# Patient Record
Sex: FEMALE | Race: WHITE | Employment: FULL TIME | URBAN - METROPOLITAN AREA
[De-identification: names, ages, dates, MRNs, and addresses within clinical notes are randomized per-mention and may not be internally consistent; named-entity substitution may affect disease eponyms.]

---

## 2019-03-20 LAB — PAP SMEAR: NORMAL

## 2020-07-17 PROBLEM — C54.1 ENDOMETRIAL CANCER (HCC): Status: ACTIVE | Noted: 2020-07-17

## 2020-07-20 PROBLEM — I26.99 BILATERAL PULMONARY EMBOLISM (HCC): Status: ACTIVE | Noted: 2020-07-20

## 2020-07-20 PROBLEM — E66.9 OBESITY: Status: ACTIVE | Noted: 2020-07-20

## 2020-07-20 PROBLEM — F32.A DEPRESSION: Status: ACTIVE | Noted: 2020-07-20

## 2020-07-20 PROBLEM — J96.91 RESPIRATORY FAILURE WITH HYPOXIA (HCC): Status: ACTIVE | Noted: 2020-07-20

## 2020-07-20 PROBLEM — I10 ESSENTIAL HYPERTENSION: Status: ACTIVE | Noted: 2020-07-20

## 2020-07-20 PROBLEM — D72.829 LEUKOCYTOSIS: Status: ACTIVE | Noted: 2020-07-20

## 2020-07-20 PROBLEM — I80.202: Status: ACTIVE | Noted: 2020-07-20

## 2020-08-07 PROBLEM — C54.1 ENDOMETRIAL CANCER, FIGO STAGE IIIC (HCC): Status: ACTIVE | Noted: 2020-08-07

## 2021-02-01 LAB
ALBUMIN SERPL-MCNC: NORMAL G/DL
ALP BLD-CCNC: NORMAL U/L
ALT SERPL-CCNC: NORMAL U/L
ANION GAP SERPL CALCULATED.3IONS-SCNC: NORMAL MMOL/L
AST SERPL-CCNC: NORMAL U/L
BILIRUB SERPL-MCNC: NORMAL MG/DL
BILIRUBIN, URINE: NORMAL
BLOOD, URINE: NORMAL
BUN BLDV-MCNC: NORMAL MG/DL
CALCIUM SERPL-MCNC: NORMAL MG/DL
CHLORIDE BLD-SCNC: NORMAL MMOL/L
CHOLESTEROL, TOTAL: 166 MG/DL
CHOLESTEROL/HDL RATIO: 3.6
CLARITY: NORMAL
CO2: NORMAL
COLOR: NORMAL
CREAT SERPL-MCNC: NORMAL MG/DL
CREATININE, URINE: 127.27
GFR CALCULATED: NORMAL
GLUCOSE BLD-MCNC: NORMAL MG/DL
GLUCOSE URINE: NORMAL
HDLC SERPL-MCNC: 46 MG/DL (ref 35–70)
KETONES, URINE: NORMAL
LDL CHOLESTEROL CALCULATED: 87 MG/DL (ref 0–160)
LDL CHOLESTEROL DIRECT: NORMAL
LEUKOCYTE ESTERASE, URINE: NORMAL
MICROALBUMIN/CREAT 24H UR: <0.7 MG/G{CREAT}
MICROALBUMIN/CREAT UR-RTO: NORMAL
NITRITE, URINE: NORMAL
PH UA: NORMAL
POTASSIUM SERPL-SCNC: NORMAL MMOL/L
PROTEIN UA: NORMAL
SODIUM BLD-SCNC: NORMAL MMOL/L
SPECIFIC GRAVITY, URINE: NORMAL
TOTAL PROTEIN: NORMAL
TRIGL SERPL-MCNC: 163 MG/DL
UROBILINOGEN, URINE: NORMAL
VLDLC SERPL CALC-MCNC: 33 MG/DL

## 2021-02-26 ENCOUNTER — HOSPITAL ENCOUNTER (OUTPATIENT)
Age: 56
Discharge: HOME | End: 2021-02-26
Payer: COMMERCIAL

## 2021-02-26 ENCOUNTER — HOSPITAL ENCOUNTER (OUTPATIENT)
Dept: CT IMAGING | Age: 56
Discharge: HOME | End: 2021-02-28
Payer: COMMERCIAL

## 2021-02-26 DIAGNOSIS — C54.1 ENDOMETRIAL CANCER (HCC): ICD-10-CM

## 2021-02-26 LAB
BUN BLDV-MCNC: 9 MG/DL (ref 6–20)
CREAT SERPL-MCNC: 0.6 MG/DL (ref 0.5–1)
GFR AFRICAN AMERICAN: >60
GFR NON-AFRICAN AMERICAN: >60 ML/MIN/1.73

## 2021-02-26 PROCEDURE — 6360000002 HC RX W HCPCS: Performed by: OBSTETRICS & GYNECOLOGY

## 2021-02-26 PROCEDURE — 84520 ASSAY OF UREA NITROGEN: CPT

## 2021-02-26 PROCEDURE — 71260 CT THORAX DX C+: CPT

## 2021-02-26 PROCEDURE — 6360000004 HC RX CONTRAST MEDICATION: Performed by: RADIOLOGY

## 2021-02-26 PROCEDURE — 2580000003 HC RX 258: Performed by: RADIOLOGY

## 2021-02-26 PROCEDURE — 36415 COLL VENOUS BLD VENIPUNCTURE: CPT

## 2021-02-26 PROCEDURE — 74177 CT ABD & PELVIS W/CONTRAST: CPT

## 2021-02-26 PROCEDURE — 82565 ASSAY OF CREATININE: CPT

## 2021-02-26 RX ORDER — HEPARIN SODIUM (PORCINE) LOCK FLUSH IV SOLN 100 UNIT/ML 100 UNIT/ML
500 SOLUTION INTRAVENOUS PRN
Status: DISCONTINUED | OUTPATIENT
Start: 2021-02-26 | End: 2021-03-01 | Stop reason: HOSPADM

## 2021-02-26 RX ORDER — SODIUM CHLORIDE 0.9 % (FLUSH) 0.9 %
10 SYRINGE (ML) INJECTION ONCE
Status: COMPLETED | OUTPATIENT
Start: 2021-02-26 | End: 2021-02-26

## 2021-02-26 RX ADMIN — IOPAMIDOL 90 ML: 755 INJECTION, SOLUTION INTRAVENOUS at 16:05

## 2021-02-26 RX ADMIN — Medication 10 ML: at 14:00

## 2021-02-26 RX ADMIN — IOHEXOL 50 ML: 240 INJECTION, SOLUTION INTRATHECAL; INTRAVASCULAR; INTRAVENOUS; ORAL at 16:06

## 2021-02-26 RX ADMIN — SODIUM CHLORIDE, PRESERVATIVE FREE 500 UNITS: 5 INJECTION INTRAVENOUS at 14:00

## 2021-02-26 NOTE — PROGRESS NOTES
Patient here for CT, and needed port accessed for Contrast.  It was easily accessed, by sterile procedure,  blood return and flushed easily. Port was de accessed after saline flush and heparin flush. No bleeding. Dry dressing applied. Georgiana Muse

## 2021-04-07 VITALS
SYSTOLIC BLOOD PRESSURE: 110 MMHG | HEIGHT: 69 IN | DIASTOLIC BLOOD PRESSURE: 74 MMHG | OXYGEN SATURATION: 96 % | RESPIRATION RATE: 14 BRPM | BODY MASS INDEX: 38.51 KG/M2 | HEART RATE: 104 BPM | TEMPERATURE: 97 F | WEIGHT: 260 LBS

## 2021-04-07 RX ORDER — FLUTICASONE PROPIONATE 50 MCG
2 SPRAY, SUSPENSION (ML) NASAL NIGHTLY
COMMUNITY
End: 2021-09-02

## 2021-04-07 RX ORDER — DOCUSATE SODIUM 100 MG/1
100 CAPSULE, LIQUID FILLED ORAL 2 TIMES DAILY
COMMUNITY

## 2021-04-07 RX ORDER — METOPROLOL SUCCINATE 50 MG/1
50 TABLET, EXTENDED RELEASE ORAL DAILY
COMMUNITY
End: 2021-05-06

## 2021-05-06 ENCOUNTER — OFFICE VISIT (OUTPATIENT)
Dept: PRIMARY CARE CLINIC | Age: 56
End: 2021-05-06
Payer: COMMERCIAL

## 2021-05-06 VITALS
WEIGHT: 258 LBS | BODY MASS INDEX: 38.21 KG/M2 | HEIGHT: 69 IN | DIASTOLIC BLOOD PRESSURE: 84 MMHG | OXYGEN SATURATION: 95 % | HEART RATE: 116 BPM | TEMPERATURE: 97.3 F | SYSTOLIC BLOOD PRESSURE: 129 MMHG

## 2021-05-06 DIAGNOSIS — F32.4 MAJOR DEPRESSIVE DISORDER WITH SINGLE EPISODE, IN PARTIAL REMISSION (HCC): ICD-10-CM

## 2021-05-06 DIAGNOSIS — I35.0 AORTIC VALVE STENOSIS, ETIOLOGY OF CARDIAC VALVE DISEASE UNSPECIFIED: ICD-10-CM

## 2021-05-06 DIAGNOSIS — Z95.828 PRESENCE OF IVC FILTER: ICD-10-CM

## 2021-05-06 DIAGNOSIS — I10 ESSENTIAL HYPERTENSION: Primary | ICD-10-CM

## 2021-05-06 DIAGNOSIS — C54.1 ENDOMETRIAL CANCER, FIGO STAGE IIIC (HCC): ICD-10-CM

## 2021-05-06 DIAGNOSIS — I26.99 BILATERAL PULMONARY EMBOLISM (HCC): ICD-10-CM

## 2021-05-06 PROCEDURE — 99213 OFFICE O/P EST LOW 20 MIN: CPT | Performed by: INTERNAL MEDICINE

## 2021-05-06 RX ORDER — BUPROPION HYDROCHLORIDE 150 MG/1
150 TABLET ORAL EVERY MORNING
Qty: 90 TABLET | Refills: 0 | Status: CANCELLED | OUTPATIENT
Start: 2021-05-06

## 2021-05-06 RX ORDER — CITALOPRAM 20 MG/1
20 TABLET ORAL DAILY
Qty: 90 TABLET | Refills: 0 | Status: SHIPPED | OUTPATIENT
Start: 2021-05-06 | End: 2021-09-02 | Stop reason: SDUPTHER

## 2021-05-06 RX ORDER — LOSARTAN POTASSIUM 100 MG/1
TABLET ORAL
COMMUNITY
Start: 2021-03-04 | End: 2021-05-06 | Stop reason: SDUPTHER

## 2021-05-06 RX ORDER — LOSARTAN POTASSIUM 100 MG/1
100 TABLET ORAL DAILY
Qty: 90 TABLET | Refills: 0 | Status: SHIPPED | OUTPATIENT
Start: 2021-05-06 | End: 2021-09-02 | Stop reason: SDUPTHER

## 2021-05-06 RX ORDER — CITALOPRAM 20 MG/1
TABLET ORAL
COMMUNITY
Start: 2021-03-04 | End: 2021-05-06 | Stop reason: SDUPTHER

## 2021-05-06 NOTE — PROGRESS NOTES
edema  Skin: unremarkable    Cholesterol, Total   Date Value Ref Range Status   02/01/2021 166 mg/dL Final     Triglycerides   Date Value Ref Range Status   02/01/2021 163 mg/dL Final     HDL   Date Value Ref Range Status   02/01/2021 46 35 - 70 mg/dL Final     VLDL   Date Value Ref Range Status   02/01/2021 33 mg/dL Final     Chol/HDL Ratio   Date Value Ref Range Status   02/01/2021 3.6  Final     Sodium   Date Value Ref Range Status   10/09/2020 136 135 - 145 mmol/L Final     Potassium   Date Value Ref Range Status   10/09/2020 4.3 3.5 - 5.1 mmol/L Final     Comment:     Slightly hemolysed, interpret with caution. Chloride   Date Value Ref Range Status   10/09/2020 102 98 - 107 mmol/L Final     CO2   Date Value Ref Range Status   10/09/2020 22 22 - 30 mmol/L Final     BUN   Date Value Ref Range Status   02/26/2021 9 6 - 20 mg/dL Final     CREATININE   Date Value Ref Range Status   02/26/2021 0.6 0.5 - 1.0 mg/dL Final     Glucose   Date Value Ref Range Status   10/09/2020 105 (H) 70 - 100 mg/dL Final     Calcium   Date Value Ref Range Status   10/09/2020 9.5 8.4 - 10.4 mg/dL Final     Total Protein   Date Value Ref Range Status   07/20/2020 6.8 6.3 - 8.2 g/dL Final     Albumin,Serum   Date Value Ref Range Status   07/20/2020 3.8 3.5 - 5.0 g/dL Final     Total Bilirubin   Date Value Ref Range Status   07/20/2020 0.3 0.2 - 1.3 mg/dL Final     Alkaline Phosphatase   Date Value Ref Range Status   07/20/2020 69 38 - 126 U/L Final     AST   Date Value Ref Range Status   07/20/2020 35 15 - 46 U/L Final     ALT   Date Value Ref Range Status   07/20/2020 24 0 - 34 U/L Final     Comment:     The ALT test is performed by an updated assay method. Please note that the reference intervals have been  changed and are now sex specific. GFR Non-   Date Value Ref Range Status   02/26/2021 >60 >=60 mL/min/1.73 Final     Comment:     Chronic Kidney Disease: less than 60 ml/min/1.73 sq.m.           Kidney Failure: less than 15 ml/min/1.73 sq.m. Results valid for patients 18 years and older. GFR    Date Value Ref Range Status   02/26/2021 >60  Final        No results found. Assessment/Plan:      Outpatient Encounter Medications as of 5/6/2021   Medication Sig Dispense Refill    apixaban (ELIQUIS) 5 MG TABS tablet Take 1 tablet by mouth 2 times daily 180 tablet 0    citalopram (CELEXA) 20 MG tablet Take 1 tablet by mouth daily 90 tablet 0    losartan (COZAAR) 100 MG tablet Take 1 tablet by mouth daily 90 tablet 0    docusate sodium (COLACE) 100 MG capsule Take 100 mg by mouth 2 times daily      fluticasone (FLONASE) 50 MCG/ACT nasal spray 2 sprays by Each Nostril route nightly      Multiple Vitamin (MULTI-VITAMIN DAILY PO) Take by mouth      buPROPion (WELLBUTRIN XL) 150 MG extended release tablet       Elastic Bandages & Supports MISC 20-30 mmHg knee-high compression stockings; Open toe  4 pair 4 each 1    Psyllium 0.36 g CAPS Take by mouth 2 times daily      omeprazole (PRILOSEC) 40 MG delayed release capsule Take 40 mg by mouth daily as needed       [DISCONTINUED] citalopram (CELEXA) 20 MG tablet       [DISCONTINUED] losartan (COZAAR) 100 MG tablet       [DISCONTINUED] metoprolol succinate (TOPROL XL) 50 MG extended release tablet Take 50 mg by mouth daily      [DISCONTINUED] triamcinolone (KENALOG) 0.1 % ointment Apply topically 2 times daily Apply topically 2 times daily.  [DISCONTINUED] traMADol (ULTRAM) 50 MG tablet Take 50 mg by mouth every 8 hours as needed for Pain.       [DISCONTINUED] dexamethasone (DECADRON) 4 MG tablet take 1 tablet by mouth twice a day for 3 days ONLY STARTING 1 DAY AFTER EACH CYCLE OF CHEMO      [DISCONTINUED] prochlorperazine (COMPAZINE) 10 MG tablet take 1 tablet by mouth every 6 hours if needed for nausea      [DISCONTINUED] ondansetron (ZOFRAN) 4 MG tablet take 1 tablet by mouth every 8 hours if needed for nausea and vomiting      [DISCONTINUED] apixaban (ELIQUIS) 5 MG TABS tablet Take 1 tablet by mouth 2 times daily 60 tablet 5    [DISCONTINUED] Multiple Vitamins-Minerals (THERAPEUTIC MULTIVITAMIN-MINERALS) tablet Take 1 tablet by mouth daily      [DISCONTINUED] ACAI CARDONA PO Take 1 tablet by mouth daily      [DISCONTINUED] LISINOPRIL PO Take 20 mg by mouth every morning And 10 mg every afternoon       No facility-administered encounter medications on file as of 5/6/2021. Josh Caruso was seen today for other. Diagnoses and all orders for this visit:    Essential hypertension  -     losartan (COZAAR) 100 MG tablet; Take 1 tablet by mouth daily  -     Comprehensive Metabolic Panel; Future    Bilateral pulmonary embolism (HCC)  -     apixaban (ELIQUIS) 5 MG TABS tablet; Take 1 tablet by mouth 2 times daily  -     CBC Auto Differential; Future    Endometrial cancer, FIGO stage IIIC (HCC)    Presence of IVC filter    Major depressive disorder with single episode, in partial remission (HCC)  -     citalopram (CELEXA) 20 MG tablet; Take 1 tablet by mouth daily    Aortic valve stenosis, etiology of cardiac valve disease unspecified         There are no Patient Instructions on file for this visit. On this date 5/6/2021 I have spent 20 minutes reviewing previous notes, test results and face to face with the patient discussing the diagnosis and importance of compliance with the treatment plan as well as documenting on the day of the visit.       Trae Puckett MD   5/6/21

## 2021-08-06 ENCOUNTER — HOSPITAL ENCOUNTER (OUTPATIENT)
Dept: CT IMAGING | Age: 56
Discharge: HOME | End: 2021-08-08
Payer: COMMERCIAL

## 2021-08-06 ENCOUNTER — HOSPITAL ENCOUNTER (OUTPATIENT)
Age: 56
Discharge: HOME | End: 2021-08-06
Payer: COMMERCIAL

## 2021-08-06 DIAGNOSIS — C54.1 ENDOMETRIAL CANCER (HCC): ICD-10-CM

## 2021-08-06 DIAGNOSIS — I10 ESSENTIAL HYPERTENSION: ICD-10-CM

## 2021-08-06 DIAGNOSIS — I26.99 BILATERAL PULMONARY EMBOLISM (HCC): ICD-10-CM

## 2021-08-06 LAB
ALBUMIN SERPL-MCNC: 4.1 G/DL (ref 3.5–5.2)
ALP BLD-CCNC: 70 U/L (ref 35–104)
ALT SERPL-CCNC: 40 U/L (ref 0–32)
ANION GAP SERPL CALCULATED.3IONS-SCNC: 10 MMOL/L (ref 7–16)
AST SERPL-CCNC: 31 U/L (ref 0–31)
BASOPHILS ABSOLUTE: 0.03 E9/L (ref 0–0.2)
BASOPHILS RELATIVE PERCENT: 0.6 % (ref 0–2)
BILIRUB SERPL-MCNC: <0.2 MG/DL (ref 0–1.2)
BUN BLDV-MCNC: 11 MG/DL (ref 6–20)
CALCIUM SERPL-MCNC: 9.2 MG/DL (ref 8.6–10.2)
CHLORIDE BLD-SCNC: 103 MMOL/L (ref 98–107)
CO2: 25 MMOL/L (ref 22–29)
CREAT SERPL-MCNC: 0.6 MG/DL (ref 0.5–1)
EOSINOPHILS ABSOLUTE: 0.06 E9/L (ref 0.05–0.5)
EOSINOPHILS RELATIVE PERCENT: 1.2 % (ref 0–6)
GFR AFRICAN AMERICAN: >60
GFR NON-AFRICAN AMERICAN: >60 ML/MIN/1.73
GLUCOSE BLD-MCNC: 102 MG/DL (ref 74–99)
HCT VFR BLD CALC: 38.5 % (ref 34–48)
HEMOGLOBIN: 12.5 G/DL (ref 11.5–15.5)
IMMATURE GRANULOCYTES #: 0.02 E9/L
IMMATURE GRANULOCYTES %: 0.4 % (ref 0–5)
LYMPHOCYTES ABSOLUTE: 2.21 E9/L (ref 1.5–4)
LYMPHOCYTES RELATIVE PERCENT: 42.6 % (ref 20–42)
MCH RBC QN AUTO: 27.7 PG (ref 26–35)
MCHC RBC AUTO-ENTMCNC: 32.5 % (ref 32–34.5)
MCV RBC AUTO: 85.2 FL (ref 80–99.9)
MONOCYTES ABSOLUTE: 0.49 E9/L (ref 0.1–0.95)
MONOCYTES RELATIVE PERCENT: 9.4 % (ref 2–12)
NEUTROPHILS ABSOLUTE: 2.38 E9/L (ref 1.8–7.3)
NEUTROPHILS RELATIVE PERCENT: 45.8 % (ref 43–80)
PDW BLD-RTO: 14.3 FL (ref 11.5–15)
PLATELET # BLD: 256 E9/L (ref 130–450)
PMV BLD AUTO: 8.6 FL (ref 7–12)
POTASSIUM SERPL-SCNC: 3.8 MMOL/L (ref 3.5–5)
RBC # BLD: 4.52 E12/L (ref 3.5–5.5)
SODIUM BLD-SCNC: 138 MMOL/L (ref 132–146)
TOTAL PROTEIN: 7.3 G/DL (ref 6.4–8.3)
WBC # BLD: 5.2 E9/L (ref 4.5–11.5)

## 2021-08-06 PROCEDURE — 6360000002 HC RX W HCPCS: Performed by: PHYSICIAN ASSISTANT

## 2021-08-06 PROCEDURE — 71260 CT THORAX DX C+: CPT

## 2021-08-06 PROCEDURE — 6360000004 HC RX CONTRAST MEDICATION: Performed by: RADIOLOGY

## 2021-08-06 PROCEDURE — 2580000003 HC RX 258: Performed by: RADIOLOGY

## 2021-08-06 PROCEDURE — 36415 COLL VENOUS BLD VENIPUNCTURE: CPT

## 2021-08-06 PROCEDURE — 2580000003 HC RX 258: Performed by: PHYSICIAN ASSISTANT

## 2021-08-06 PROCEDURE — 85025 COMPLETE CBC W/AUTO DIFF WBC: CPT

## 2021-08-06 PROCEDURE — 74177 CT ABD & PELVIS W/CONTRAST: CPT

## 2021-08-06 PROCEDURE — 80053 COMPREHEN METABOLIC PANEL: CPT

## 2021-08-06 RX ORDER — SODIUM CHLORIDE 0.9 % (FLUSH) 0.9 %
10 SYRINGE (ML) INJECTION
Status: COMPLETED | OUTPATIENT
Start: 2021-08-06 | End: 2021-08-06

## 2021-08-06 RX ORDER — HEPARIN SODIUM (PORCINE) LOCK FLUSH IV SOLN 100 UNIT/ML 100 UNIT/ML
500 SOLUTION INTRAVENOUS ONCE
Status: COMPLETED | OUTPATIENT
Start: 2021-08-06 | End: 2021-08-06

## 2021-08-06 RX ORDER — SODIUM CHLORIDE 0.9 % (FLUSH) 0.9 %
10 SYRINGE (ML) INJECTION ONCE
Status: COMPLETED | OUTPATIENT
Start: 2021-08-06 | End: 2021-08-06

## 2021-08-06 RX ADMIN — HEPARIN 500 UNITS: 100 SYRINGE at 12:01

## 2021-08-06 RX ADMIN — SODIUM CHLORIDE, PRESERVATIVE FREE 10 ML: 5 INJECTION INTRAVENOUS at 12:00

## 2021-08-06 RX ADMIN — IOPAMIDOL 90 ML: 755 INJECTION, SOLUTION INTRAVENOUS at 11:33

## 2021-08-06 RX ADMIN — Medication 10 ML: at 11:34

## 2021-08-06 RX ADMIN — IOHEXOL 50 ML: 240 INJECTION, SOLUTION INTRATHECAL; INTRAVASCULAR; INTRAVENOUS; ORAL at 11:34

## 2021-08-06 NOTE — PROGRESS NOTES
[facility] [place] accessed per order. Patient tolerated well. No s/s of complications noted or reported. 1201 - Flushed with normal saline then heparin flush per order. De-accessed, dry clean dressing placed over site. Patient tolerated well with no s/s of complications noted or reported.

## 2021-08-16 DIAGNOSIS — I26.99 BILATERAL PULMONARY EMBOLISM (HCC): ICD-10-CM

## 2021-08-16 NOTE — TELEPHONE ENCOUNTER
----- Message from Cm Sherita sent at 8/16/2021 12:01 PM EDT -----  Subject: Refill Request    QUESTIONS  Name of Medication? apixaban (ELIQUIS) 5 MG TABS tablet  Patient-reported dosage and instructions? take once a day 5mg  How many days do you have left? 3  Preferred Pharmacy? [facility] phone number (if available)? [phone]  Additional Information for Provider? the patient wants to know if you want   to see her before she goes to the [address] [place] [address] again for her cancer if   so give her a call.  ---------------------------------------------------------------------------  --------------  [address]  What is the best way for the office to contact you? OK to leave message on   voicemail  Preferred Call Back Phone Number?  [phone]

## 2021-08-31 LAB

## 2021-09-02 ENCOUNTER — OFFICE VISIT (OUTPATIENT)
Dept: PRIMARY CARE CLINIC | Age: 56
End: 2021-09-02
Payer: COMMERCIAL

## 2021-09-02 VITALS
BODY MASS INDEX: 35.93 KG/M2 | TEMPERATURE: 96.8 F | SYSTOLIC BLOOD PRESSURE: 120 MMHG | HEIGHT: 70 IN | DIASTOLIC BLOOD PRESSURE: 78 MMHG | WEIGHT: 251 LBS | HEART RATE: 90 BPM | OXYGEN SATURATION: 94 %

## 2021-09-02 DIAGNOSIS — K21.9 GASTROESOPHAGEAL REFLUX DISEASE WITHOUT ESOPHAGITIS: ICD-10-CM

## 2021-09-02 DIAGNOSIS — C54.1 ENDOMETRIAL CANCER, FIGO STAGE IVB (HCC): ICD-10-CM

## 2021-09-02 DIAGNOSIS — F32.4 MAJOR DEPRESSIVE DISORDER WITH SINGLE EPISODE, IN PARTIAL REMISSION (HCC): ICD-10-CM

## 2021-09-02 DIAGNOSIS — I10 ESSENTIAL HYPERTENSION: Primary | ICD-10-CM

## 2021-09-02 PROCEDURE — 99214 OFFICE O/P EST MOD 30 MIN: CPT | Performed by: INTERNAL MEDICINE

## 2021-09-02 RX ORDER — LOSARTAN POTASSIUM 100 MG/1
100 TABLET ORAL DAILY
Qty: 90 TABLET | Refills: 0 | Status: SHIPPED
Start: 2021-09-02 | End: 2021-11-29 | Stop reason: SDUPTHER

## 2021-09-02 RX ORDER — OMEPRAZOLE 40 MG/1
40 CAPSULE, DELAYED RELEASE ORAL DAILY
Qty: 90 CAPSULE | Refills: 0 | Status: SHIPPED
Start: 2021-09-02 | End: 2021-11-29 | Stop reason: SDUPTHER

## 2021-09-02 RX ORDER — CITALOPRAM 20 MG/1
20 TABLET ORAL DAILY
Qty: 90 TABLET | Refills: 0 | Status: SHIPPED
Start: 2021-09-02 | End: 2021-11-29 | Stop reason: SDUPTHER

## 2021-09-02 SDOH — ECONOMIC STABILITY: FOOD INSECURITY: WITHIN THE PAST 12 MONTHS, YOU WORRIED THAT YOUR FOOD WOULD RUN OUT BEFORE YOU GOT MONEY TO BUY MORE.: NEVER TRUE

## 2021-09-02 SDOH — ECONOMIC STABILITY: FOOD INSECURITY: WITHIN THE PAST 12 MONTHS, THE FOOD YOU BOUGHT JUST DIDN'T LAST AND YOU DIDN'T HAVE MONEY TO GET MORE.: NEVER TRUE

## 2021-09-02 ASSESSMENT — SOCIAL DETERMINANTS OF HEALTH (SDOH): HOW HARD IS IT FOR YOU TO PAY FOR THE VERY BASICS LIKE FOOD, HOUSING, MEDICAL CARE, AND HEATING?: NOT HARD AT ALL

## 2021-09-02 NOTE — PROGRESS NOTES
Chief Complaint   Patient presents with    Hypertension     Pt is here as a 4 month F/U, with no recent exposure to Covid. Pt states she is no longer in remission and has mets to [facility] and abdomen, newly informed of being approved for a newer medication of Chemo. Labs on file from 8/31 for review. HPI:  Patient is here for follow-up of hypertension depression and endometrial cancer unfortunately patient had a recent CT scan of abdomen and pelvis and of chest that showed multiple abdominal lymphadenopathy and a spot on the lung and another 1 on the liver. She had a PET scan which showed only the intra-abdominal lymphadenopathy. She has went for a second opinion at the [facility] and they are going to start her on immunotherapy as she qualifies for that. Patient appears depressed but she is trying to cling into her juventino and her trust in God that she will be able to conquer that recurrence. Blood work was discussed with patient appears within reasonable range except for mild elevation of the ALT which was within normal limits before of questionable significance in presence of the nodule on the CAT scan. Patient is contemplating disability. .    Past Medical History, Surgical History, and Family History has been reviewed and updated.     Review of Systems:  Constitutional:  No fever, no fatigue, no chills, no headaches, no weight change  Dermatology:  No rash, no mole, no dry or sensitive skin  ENT:  No cough, no sore throat, no sinus pain, no runny nose, no ear pain  Cardiology:  No chest pain, no palpitations, no leg edema, no shortness of breath, no PND  Gastroenterology:  No dysphagia, no abdominal pain, no nausea, no vomiting, no constipation, no diarrhea, no heartburn  Musculoskeletal:  No joint pain, no leg cramps, no back pain, no muscle aches  Respiratory:  No shortness of breath, no orthopnea, no wheezing, no MAGALLON, no hemoptysis  Urology:  No blood in the urine, no urinary frequency, no urinary incontinence, no urinary urgency, no nocturia, no dysuria    Vitals:    09/02/21 1332   BP: 120/78   Pulse: 90   Temp: 96.8 °F (36 °C)   TempSrc: Temporal   SpO2: 94%   Weight: 251 lb (113.9 kg)   Height: 5' 10\" (1.778 m)       General:  Patient alert and oriented x 3, NAD, pleasant  HEENT:  Atraumatic, normocephalic, PERRLA, EOMI, clear conjunctiva, TMs clear, nose-clear, throat - no erythema  Neck:  Supple, no goiter, no carotid bruits, no LAD  Lungs:  CTA   Heart:  RRR, no murmurs, gallops or rubs  Abdomen:  Soft/nt/nd, + bowel sounds  Extremities:  No clubbing, cyanosis or edema  Skin: unremarkable    Cholesterol, Total   Date Value Ref Range Status   02/01/2021 166 mg/dL Final     Triglycerides   Date Value Ref Range Status   02/01/2021 163 mg/dL Final     HDL   Date Value Ref Range Status   02/01/2021 46 35 - 70 mg/dL Final     LDL Calculated   Date Value Ref Range Status   02/01/2021 87 0 - 160 mg/dL Final     VLDL   Date Value Ref Range Status   02/01/2021 33 mg/dL Final     Chol/HDL Ratio   Date Value Ref Range Status   02/01/2021 3.6  Final     Sodium   Date Value Ref Range Status   08/06/2021 138 132 - 146 mmol/L Final     Potassium   Date Value Ref Range Status   08/06/2021 3.8 3.5 - 5.0 mmol/L Final     Chloride   Date Value Ref Range Status   08/06/2021 103 98 - 107 mmol/L Final     CO2   Date Value Ref Range Status   08/06/2021 25 22 - 29 mmol/L Final     BUN   Date Value Ref Range Status   08/06/2021 11 6 - 20 mg/dL Final     CREATININE   Date Value Ref Range Status   08/06/2021 0.6 0.5 - 1.0 mg/dL Final     Glucose   Date Value Ref Range Status   08/06/2021 102 (H) 74 - 99 mg/dL Final     Calcium   Date Value Ref Range Status   08/06/2021 9.2 8.6 - 10.2 mg/dL Final     Total Protein   Date Value Ref Range Status   08/06/2021 7.3 6.4 - 8.3 g/dL Final     Albumin   Date Value Ref Range Status   08/06/2021 4.1 3.5 - 5.2 g/dL Final     Total Bilirubin   Date Value Ref Range Status 08/06/2021 <0.2 0.0 - 1.2 mg/dL Final     Alkaline Phosphatase   Date Value Ref Range Status   08/06/2021 70 35 - 104 U/L Final     AST   Date Value Ref Range Status   08/06/2021 31 0 - 31 U/L Final     ALT   Date Value Ref Range Status   08/06/2021 40 (H) 0 - 32 U/L Final     GFR Non-   Date Value Ref Range Status   08/06/2021 >60 >=60 mL/min/1.73 Final     Comment:     Chronic Kidney Disease: less than 60 ml/min/1.73 sq.m. Kidney Failure: less than 15 ml/min/1.73 sq.m. Results valid for patients 18 years and older. GFR    Date Value Ref Range Status   08/06/2021 >60  Final        CT Chest W Contrast    Result Date: 8/9/2021  EXAMINATION: CT OF THE CHEST WITH CONTRAST 8/6/2021 11:34 am TECHNIQUE: CT of the chest was performed with the administration of intravenous contrast. Multiplanar reformatted images are provided for review. Dose modulation, iterative reconstruction, and/or weight based adjustment of the mA/kV was utilized to reduce the radiation dose to as low as reasonably achievable. Dose is total DLP 2393.16 M Gy cm. Contrast is 100 mL Isovue 370 IV. COMPARISON: CT chest with contrast 02/26/2021. HISTORY: ORDERING SYSTEM PROVIDED HISTORY: Endometrial cancer [facility]) TECHNOLOGIST PROVIDED HISTORY: Reason for exam:->restaging What reading provider will be dictating this exam?->CRC FINDINGS: Thyroid: Chronic small complex nodule in the anterior inferior left thyroid lobe measuring approximately 1.2 cm x 1.2 cm by 1.5 cm with a small cystic component, not obviously changed. Rest of the visualized lower thyroid is normal.  Upper thyroid not included. Esophagus: Normal appearance. Not thickened or dilated. No hiatal hernia. Mediastinum: A few small right paratracheal lymph nodes. The largest superior right paratracheal lymph node measures 8 mm x 4 mm. No obvious hilar adenopathy. Heart size within normal.  Mild CAD. Small superior pericardial effusion.  No anterior margin of the pancreatic body measures 7 mm x 7 mm, probably unchanged. Pancreatic duct is not dilated. Adrenal glands are not enlarged. No ascites. Soft Tissues/Bones: Diffuse spine degeneration. Multiple spurs and disc degeneration. Multiple levels showing mild endplate deformities and several small Schmorl's node deformities. Slight anterior wedge deformity at T12. No obvious acute fracture. No obvious bony metastatic disease. 1.  interval increase in size of small noncalcified nodules in the right upper lobe and left lower lobe since 02/26/2021. 2.  Increased size of lesions lateral segment left hepatic lobe suspicious for metastatic disease. 3.  Mild right upper quadrant lymphadenopathy. 4.  Chronic small inferior left thyroid nodule. 5.  Chronic small pancreatic cyst.     CT ABDOMEN PELVIS W IV CONTRAST Additional Contrast? Oral    Result Date: 8/9/2021  EXAMINATION: CT OF THE ABDOMEN AND PELVIS WITH CONTRAST 8/6/2021 11:34 am TECHNIQUE: CT of the abdomen and pelvis was performed with the administration of intravenous contrast. Multiplanar reformatted images are provided for review. Dose modulation, iterative reconstruction, and/or weight based adjustment of the mA/kV was utilized to reduce the radiation dose to as low as reasonably achievable. Dose is total DLP 2393.16 M Gy cm. Contrast is 100 mL Isovue 370 IV. COMPARISON: CT abdomen and pelvis with contrast 02/26/2021. HISTORY: ORDERING SYSTEM PROVIDED HISTORY: Endometrial cancer [facility]) TECHNOLOGIST PROVIDED HISTORY: Additional Contrast?->Oral Reason for exam:->restaging What reading provider will be dictating this exam?->CRC FINDINGS: Lower Chest: Chronic early subpleural fibrotic changes in the medial right lung base adjacent to large thoracic spurs. Left lung base is clear. No effusion. Heart size within normal.  No hiatal hernia.  Organs: Liver: Abnormal.  Increased size of a chronic mass in the lateral segment left hepatic lobe measuring 3.8 cm x 3.8 cm by 3.4 cm with 2 anterior satellite lesions. The dominant lesion was previously 3.1 cm. It has a thin irregular enhancing rim. Suspicious for metastatic disease. 1 of the satellite lesions is new. The old lesion has increased in size and is now 1.1 cm, the new lesion is 5 mm. The rest of the liver shows moderate fatty infiltration, with focal sparing adjacent to the gallbladder fossa. Liver length 20 cm, not significantly changed. Patent hepatic veins and portal veins, no thrombosis. Portal vein has normal size 1.5 cm. Gallbladder is present with normal size and appearance. No calcified stones or wall thickening. The common bile duct measures maximum the 6 mm and tapers to 3 mm. Pancreas is not enlarged. There is a chronic small pancreatic cyst along the anterior margin of the pancreatic body measuring 7 mm by a 7 mm x 7 mm with a slightly ovoid shape, unchanged. The main pancreatic duct is not dilated. The spleen measures 13 cm x 11 cm x 7 cm, upper normal.  No focal splenic masses. 2 small stable accessory splenic nodules at the inferior splenic hilum, 5 mm and 6 mm. Adrenal glands have normal size and appearance. Kidneys: The right kidney is slightly compressed and slightly depressed due to the enlarged liver. 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 A minimally prominent lower pole moiety of the right renal collecting system. No stones or hydronephrosis. No focal cortical lesions or cyst on either side. No renal or ureter stones or hydronephrosis. Aorta: No aneurysm or dissection. No significant vascular calcification. No stenosis at the origin of the celiac artery, SMA, or renal arteries. Normal IVC. Lymph nodes: A few small right upper quadrant, andrew hepatis and portal caval lymph nodes measuring up to 1.7 cm.   A suspicious aortocaval lymph node above the level of the bifurcation measuring 1.4 cm by a 1.4 cm diameter by a 2.3 cm in length. It has indistinct margins with haziness of the surrounding retroperitoneal fat. It has increased in size, was previously 6 mm. Suspicious for metastatic disease. Suspicious lymph node in the right upper pelvis along the common iliac vessels measuring 1.7 cm AP x 1.3 cm transverse by 2.1 cm in length. It was previously 8 mm. Suspicious. Otherwise only tiny pelvic lymph nodes. A few additional tiny left periaortic lymph nodes. Pre aortic lymph node at the level of the left renal vein measures 7 mm x 3 mm. Very minimally larger than on 02/26/2021. A few tiny lymph nodes in the small bowel mesentery are not significantly changed and are less than 1 cm. GI/Bowel: No hiatal hernia. Grossly normal appearance of the stomach and small bowel. No obstruction. No dilated loops. Tiny umbilical hernia containing only fat. No herniated bowel. Pelvis: Post hysterectomy. Absent uterus. Ovaries are not seen, presumed to be absent. Urinary bladder was only slightly full at the time of exam, mild wall thickening likely due to under distension. Normal distal ureters. Several pelvic phleboliths. Right upper pelvic lymphadenopathy as discussed above. No free fluid. Chronic moderate diverticulosis of the proximal half of the sigmoid colon. No acute diverticulitis or significant wall thickening. Average amount of residual stool. Horizontal position of the cecum and proximal colon, mid abdomen level. A normal appendix in the anterior right mid to lower abdomen. Minimal fat in bilateral inguinal canals right more than left, unchanged. No herniated bowel. . Peritoneum/Retroperitoneum: No free air, free fluid or abscess. Bones/Soft Tissues: A stable 3 mm sclerotic density in the inferior left side of T8. A stable 5 mm sclerotic density in the right femoral head.   Moderate diffuse thoracic degeneration with large right lateral hypertrophic spurs and multiple level showing Schmorl's node deformities. A minimal loss of height at T8. Moderate dextroconvex lumbar curvature centered at L2. Mild retrolisthesis L1 on L2 and L2 on L3. Chronic lumbar degeneration and scoliosis. A mild stenosis at L3/4. Vacuum disc degeneration L5/S1 with moderate spurring and no stenosis. Moderate spurring at the pubic symphysis. A stable 4 mm sclerotic density in the proximal left femur intertrochanteric level. No lytic lesions. 1.  Increasing metastatic disease in the lateral segment left hepatic lobe. 2.   Increasing metastatic lymphadenopathy in retroperitoneal and right upper pelvic lymph nodes. 3.  Stable small pancreatic cyst. 4.  Chronic diffuse spine degeneration and scoliosis with a few scattered tiny sclerotic lesions, not definitely metastatic in etiology. 5.  Chronic fatty liver. 6.  Diverticulosis without diverticulitis. PET CT SKULL BASE TO MID THIGH    Result Date: 8/12/2021  Patient Name:                Pratik Noble MRN:                         [id number] Acct#:                       [id number] PET ACCESSION                 EXAM DATE/TIME           PROCEDURE                 ORDERING PROVIDER -[id number]             8/12/2021 09:54 EDT      PT w/ CT Scan Skull Base  [place], [place] to Midthigh CPT code 95567  Reason For Exam (PT w/ CT Scan Skull Base to Midthigh) Malignant neoplasm of endometrium Report Indication: Restaging endometrial malignancy. The patient was injected with 13 mCi of F-18 FDG and PET/CT images were obtained from the level of the skull base to the mid thigh level. The low dose CT scan was performed for attenuation correction and anatomic localization. There are no prior studies available for comparison. No suspicious areas of FDG accumulation are identified within the neck.  No suspicious areas of FDG accumulation are identified within the chest. Mild to moderate FDG accumulation (SUV max of 2.7) corresponds to streaky infiltrate or atelectatic change in the right lung base. There are no FDG avid pulmonary nodules. Intense abnormal FDG accumulation (SUV max of 8.6) corresponds to a 1.5 x 1.2 cm enlarged periaortic/pericaval retroperitoneal lymph node at the level of the kidneys (visualized best on image 183 of 312 of the axial low dose CT images). There is also intense abnormal FDG accumulation corresponding to an enlarged 1.5 x 1.4 cm iliac lymph node at the level of the bifurcation of the right common iliac artery (seen best on image 209 of 312 of the axial low dose CT images). The intensity of uptake in these nodes meets the criteria for malignancy. No other suspicious areas of FDG accumulation are identified within the abdomen or pelvis. There is physiologic bowel uptake. On the accompanying low dose CT scan, note is made of diffuse fatty infiltration of the liver. No suspicious areas of FDG accumulation are identified within the visualized osseous structures. IMPRESSION: 1. Intense FDG accumulation corresponding to a couple enlarged abdominal and pelvic lymph nodes. The intensity of uptake in these nodes meets the criteria for malignancy. No other suspicious areas of FDG accumulation are visualized. 2. Mild to moderate FDG accumulation corresponding to streaky infiltrate or atelectatic change in the right lung base.                                                    PET Report Report Dictated on Workstation: [id number] ---** Final ---** Dictating Physician: Fuad Contreras Signed Date and Time: 08/12/2021 10:23 am Signed by: Fuad Contreras Transcribed Date and Time: 08/12/2021 10:24       Assessment/Plan:      Outpatient Encounter Medications as of 9/2/2021   Medication Sig Dispense Refill    losartan (COZAAR) 100 MG tablet Take 1 tablet by mouth daily 90 tablet 0    citalopram (CELEXA) 20 MG tablet Take 1 tablet by mouth daily 90 tablet 0    omeprazole (PRILOSEC) 40 MG delayed release capsule Take 1 capsule by mouth daily 90 capsule 0    apixaban (ELIQUIS) this date 9/2/2021 I have spent 35 minutes reviewing previous notes, test results and face to face with the patient discussing the diagnosis and importance of compliance with the treatment plan as well as documenting on the day of the visit.       Zaina Paulson MD   9/2/21

## 2021-11-24 LAB

## 2021-11-29 DIAGNOSIS — I10 ESSENTIAL HYPERTENSION: ICD-10-CM

## 2021-11-29 DIAGNOSIS — F32.4 MAJOR DEPRESSIVE DISORDER WITH SINGLE EPISODE, IN PARTIAL REMISSION (HCC): ICD-10-CM

## 2021-11-29 DIAGNOSIS — K21.9 GASTROESOPHAGEAL REFLUX DISEASE WITHOUT ESOPHAGITIS: ICD-10-CM

## 2021-11-29 DIAGNOSIS — I26.99 BILATERAL PULMONARY EMBOLISM (HCC): ICD-10-CM

## 2021-11-29 RX ORDER — OMEPRAZOLE 40 MG/1
40 CAPSULE, DELAYED RELEASE ORAL DAILY
Qty: 90 CAPSULE | Refills: 0 | Status: SHIPPED
Start: 2021-11-29 | End: 2021-12-06

## 2021-11-29 RX ORDER — CITALOPRAM 20 MG/1
20 TABLET ORAL DAILY
Qty: 90 TABLET | Refills: 0 | Status: SHIPPED
Start: 2021-11-29 | End: 2022-02-07 | Stop reason: SDUPTHER

## 2021-11-29 RX ORDER — LOSARTAN POTASSIUM 100 MG/1
100 TABLET ORAL DAILY
Qty: 90 TABLET | Refills: 0 | Status: SHIPPED
Start: 2021-11-29 | End: 2022-02-07 | Stop reason: SDUPTHER

## 2021-12-06 ENCOUNTER — OFFICE VISIT (OUTPATIENT)
Dept: PRIMARY CARE CLINIC | Age: 56
End: 2021-12-06
Payer: COMMERCIAL

## 2021-12-06 VITALS
SYSTOLIC BLOOD PRESSURE: 120 MMHG | OXYGEN SATURATION: 95 % | BODY MASS INDEX: 34.88 KG/M2 | DIASTOLIC BLOOD PRESSURE: 80 MMHG | TEMPERATURE: 97.5 F | WEIGHT: 243.6 LBS | HEART RATE: 86 BPM | HEIGHT: 70 IN

## 2021-12-06 DIAGNOSIS — I35.0 AORTIC VALVE STENOSIS, ETIOLOGY OF CARDIAC VALVE DISEASE UNSPECIFIED: ICD-10-CM

## 2021-12-06 DIAGNOSIS — I26.99 BILATERAL PULMONARY EMBOLISM (HCC): ICD-10-CM

## 2021-12-06 DIAGNOSIS — I10 ESSENTIAL HYPERTENSION: Primary | ICD-10-CM

## 2021-12-06 DIAGNOSIS — K21.9 GASTROESOPHAGEAL REFLUX DISEASE WITHOUT ESOPHAGITIS: ICD-10-CM

## 2021-12-06 DIAGNOSIS — C54.1 ENDOMETRIAL CANCER, FIGO STAGE IVB (HCC): ICD-10-CM

## 2021-12-06 PROCEDURE — 99213 OFFICE O/P EST LOW 20 MIN: CPT | Performed by: INTERNAL MEDICINE

## 2021-12-06 RX ORDER — OMEPRAZOLE 40 MG/1
40 CAPSULE, DELAYED RELEASE ORAL DAILY
Qty: 90 CAPSULE | Refills: 0
Start: 2021-12-06 | End: 2022-03-08 | Stop reason: SDUPTHER

## 2021-12-06 NOTE — PROGRESS NOTES
CHIEF COMPLAINT: presents for a complete eye exam    HISTORY OF PRESENT ILLNESS: agree with tech note and denies pain, or discharge.***    Hemoglobin A1C (%)   Date Value   10/23/2018 9.1 (H)     GLAUCOMA SUMMARY:   FAMILY HISTORY: negative for glaucoma  DILATED EXAM: Last done 11/18***  VISUAL FIELD: last done- 7/20 (do left eye only)  OCT (optical coherence tomography): Right eye- not done, no vision   Left eye- 66  Last done 1/2020  GONIOSCOPY: last done-   CONTRAINDICATIONS/ALLERGIES: none  PREVIOUS GLAUCOMA LASERS: none  PREVIOUS GLAUCOMA SURGICAL PROCEDURES: tube shunt placement both eyes 2016  PREVIOUS REFRACTIVE SURGERY: none    ASSESSMENT/PLAN  Rubeotic glaucoma both eyes: Pressure at target in the right eye (will treat only for pain control given no vision potential)  And now at target left eye. Continue timolol twice a day in the left eye.  Patient aware of high likelihood of further vision loss in the left eye without proper use of drops and proper follow up.  Type 1 Diabetes with stable proliferative diabetic retinopathy both eyes without macular edema both eyes: discussed importance of good blood sugar control and target hemoglobin A1C of 7.0 or less.    Poor vision right eye: untreatable, secondary to severe rubeotic glaucoma  Pseudophakia both eyes: stable with open posterior capsule right eye.  Posterior Capsular Opacity left eye: not visually significant.    RETURN FOR PRESSURE CHECK IN 3 MONTHS or immediately prn as instructed for pain, redness, or decreased vision    EDUARDO, Barb Wild, COA, OSC, attest that I performed the duties of a scribe for this encounter, in the presence of Benjamin Telles MD who personally saw and examined the patient.    ***     Chief Complaint   Patient presents with   [employer] [address]     Pt is here as a 3 month F/U, with no recent known exposure to Covid. Pt has had recent labs and results were faxed over by [facility]. Pt states she had a CT scan of Pelvis and the results can be viewed on [facility] Everywhere, on 11/19/2021.  Hypertension       HPI:  Patient is here for follow-up of hypertension and metastatic uterine cancer. She is doing better. She has started [place] and had a CT scan abdomen and pelvis few weeks ago which showed improvement in the lymphadenopathy in the pelvic area. The spot on the liver appears to be a hepatic cyst and the tiny nodules on the lung appear stable. She is very excited about it. She has changed her diet completely and she has lost about 8 pounds. Blood work was reviewed with patient including CMP and CBC appears within normal limits. She has been compliant with medications. She denies any shortness of breath or chest pains abdominal pains nausea or vomiting but she have chronic fatigue and headaches since the chemotherapy had started. She had received both of her Covid vaccines and her flu vaccine  Her last mammogram was July 2021. Past Medical History, Surgical History, and Family History has been reviewed and updated.     Review of Systems:  Constitutional:  No fever, no fatigue, no chills, no headaches, no weight change  Dermatology:  No rash, no mole, no dry or sensitive skin  ENT:  No cough, no sore throat, no sinus pain, no runny nose, no ear pain  Cardiology:  No chest pain, no palpitations, no leg edema, no shortness of breath, no PND  Gastroenterology:  No dysphagia, no abdominal pain, no nausea, no vomiting, no constipation, no diarrhea, no heartburn  Musculoskeletal:  No joint pain, no leg cramps, no back pain, no muscle aches  Respiratory:  No shortness of breath, no orthopnea, no wheezing, no MAGALLON, no hemoptysis  Urology:  No blood in the urine, no urinary frequency, no urinary incontinence, no urinary urgency, no nocturia, no dysuria    Vitals:    12/06/21 0848   BP: 120/80   Pulse: 86   Temp: 97.5 °F (36.4 °C)   TempSrc: Temporal   SpO2: 95%   Weight: 243 lb 9.6 oz (110.5 kg)   Height: 5' 10\" (1.778 m)       General:  Patient alert and oriented x 3, NAD, pleasant  HEENT:  Atraumatic, normocephalic, PERRLA, EOMI, clear conjunctiva, TMs clear, nose-clear, throat - no erythema  Neck:  Supple, no goiter, no carotid bruits, no LAD  Lungs:  CTA   Heart:  RRR, no murmurs, gallops or rubs  Abdomen:  Soft/nt/nd, + bowel sounds  Extremities:  No clubbing, cyanosis or edema  Skin: unremarkable    Cholesterol, Total   Date Value Ref Range Status   02/01/2021 166 mg/dL Final     Triglycerides   Date Value Ref Range Status   02/01/2021 163 mg/dL Final     HDL   Date Value Ref Range Status   02/01/2021 46 35 - 70 mg/dL Final     LDL Calculated   Date Value Ref Range Status   02/01/2021 87 0 - 160 mg/dL Final     VLDL   Date Value Ref Range Status   02/01/2021 33 mg/dL Final     Chol/HDL Ratio   Date Value Ref Range Status   02/01/2021 3.6  Final     Sodium   Date Value Ref Range Status   08/06/2021 138 132 - 146 mmol/L Final     Potassium   Date Value Ref Range Status   08/06/2021 3.8 3.5 - 5.0 mmol/L Final     Chloride   Date Value Ref Range Status   08/06/2021 103 98 - 107 mmol/L Final     CO2   Date Value Ref Range Status   08/06/2021 25 22 - 29 mmol/L Final     BUN   Date Value Ref Range Status   08/06/2021 11 6 - 20 mg/dL Final     CREATININE   Date Value Ref Range Status   08/06/2021 0.6 0.5 - 1.0 mg/dL Final     Glucose   Date Value Ref Range Status   08/06/2021 102 (H) 74 - 99 mg/dL Final     Calcium   Date Value Ref Range Status   08/06/2021 9.2 8.6 - 10.2 mg/dL Final     Total Protein   Date Value Ref Range Status   08/06/2021 7.3 6.4 - 8.3 g/dL Final     Albumin   Date Value Ref Range Status   08/06/2021 4.1 3.5 - 5.2 g/dL Final     Total Bilirubin   Date Value Ref Range Status   08/06/2021 <0.2 0.0 - 1.2 mg/dL Final     Alkaline Phosphatase   Date Value Ref Range Status   08/06/2021 70 35 - 104 U/L Final     AST   Date Value Ref Range Status   08/06/2021 31 0 - 31 U/L Final     ALT   Date Value Ref Range Status   08/06/2021 40 (H) 0 - 32 U/L Final     GFR Non-   Date Value Ref Range Status   08/06/2021 >60 >=60 mL/min/1.73 Final     Comment:     Chronic Kidney Disease: less than 60 ml/min/1.73 sq.m. Kidney Failure: less than 15 ml/min/1.73 sq.m. Results valid for patients 18 years and older. GFR    Date Value Ref Range Status   08/06/2021 >60  Final        No results found. Assessment/Plan:      Outpatient Encounter Medications as of 12/6/2021   Medication Sig Dispense Refill    omeprazole (PRILOSEC) 40 MG delayed release capsule Take 1 capsule by mouth daily PRN 90 capsule 0    losartan (COZAAR) 100 MG tablet Take 1 tablet by mouth daily 90 tablet 0    citalopram (CELEXA) 20 MG tablet Take 1 tablet by mouth daily 90 tablet 0    apixaban (ELIQUIS) 5 MG TABS tablet Take 1 tablet by mouth 2 times daily 180 tablet 0    docusate sodium (COLACE) 100 MG capsule Take 100 mg by mouth 2 times daily      Multiple Vitamin (MULTI-VITAMIN DAILY PO) Take by mouth      Psyllium 0.36 g CAPS Take by mouth 2 times daily      [DISCONTINUED] omeprazole (PRILOSEC) 40 MG delayed release capsule Take 1 capsule by mouth daily (Patient taking differently: Take 40 mg by mouth daily PRN) 90 capsule 0    [DISCONTINUED] apixaban (ELIQUIS) 5 MG TABS tablet Take 1 tablet by mouth 2 times daily 180 tablet 0     No facility-administered encounter medications on file as of 12/6/2021. Shirley Shayne was seen today for discuss labs and hypertension. Diagnoses and all orders for this visit:    Essential hypertension    Gastroesophageal reflux disease without esophagitis  -     omeprazole (PRILOSEC) 40 MG delayed release capsule;  Take 1 capsule by mouth daily PRN    Aortic valve stenosis, etiology of cardiac valve disease unspecified    Bilateral pulmonary embolism (HCC)    Endometrial cancer, FIGO stage IVB ([facility] [address]         There are no Patient Instructions on file for this visit. On this date 12/6/2021 I have spent 25 minutes reviewing previous notes, test results and face to face with the patient discussing the diagnosis and importance of compliance with the treatment plan as well as documenting on the day of the visit.       Liz Clifton MD   12/6/21

## 2021-12-15 LAB

## 2022-01-19 ENCOUNTER — TELEPHONE (OUTPATIENT)
Dept: PRIMARY CARE CLINIC | Age: 57
End: 2022-01-19

## 2022-01-19 NOTE — TELEPHONE ENCOUNTER
Patients son tested positive for covid- states their testing came back negative would like an antibiotic or something for congestion, fever, cough    Would like enough for entire family    Abel Gonzalez and shawn      [facility]

## 2022-01-19 NOTE — TELEPHONE ENCOUNTER
I am relaying this note to you regarding the [facility]. Please advise.

## 2022-01-20 NOTE — TELEPHONE ENCOUNTER
Please find out what kind of test they got whether it was the rapid or the PCR. If it was the rapid the need to get a PCR  The need to be evaluated separately.   There is no antibiotics for the whole family in the time of COVID

## 2022-01-20 NOTE — TELEPHONE ENCOUNTER
This pt was contacted and Covid test was Rapid and pt advised to obtain a PCR Test. Pt states im in [facility] now and was hoping for an antibiotic, well just forget it ill call back later when im in town.

## 2022-02-02 DIAGNOSIS — I10 ESSENTIAL HYPERTENSION: ICD-10-CM

## 2022-02-02 DIAGNOSIS — I26.99 BILATERAL PULMONARY EMBOLISM (HCC): ICD-10-CM

## 2022-02-02 DIAGNOSIS — F32.4 MAJOR DEPRESSIVE DISORDER WITH SINGLE EPISODE, IN PARTIAL REMISSION (HCC): ICD-10-CM

## 2022-02-07 RX ORDER — CITALOPRAM 20 MG/1
20 TABLET ORAL DAILY
Qty: 90 TABLET | Refills: 0 | Status: SHIPPED
Start: 2022-02-07 | End: 2022-03-08 | Stop reason: SDUPTHER

## 2022-02-07 RX ORDER — LOSARTAN POTASSIUM 100 MG/1
100 TABLET ORAL DAILY
Qty: 90 TABLET | Refills: 0 | Status: SHIPPED
Start: 2022-02-07 | End: 2022-03-08

## 2022-02-09 ENCOUNTER — TELEPHONE (OUTPATIENT)
Dept: PRIMARY CARE CLINIC | Age: 57
End: 2022-02-09

## 2022-02-09 NOTE — TELEPHONE ENCOUNTER
Pt taking blood pressure medication states been getting dizzy for last three or four days bp 98/65 taken twice- should she stop medication?

## 2022-03-08 ENCOUNTER — OFFICE VISIT (OUTPATIENT)
Dept: PRIMARY CARE CLINIC | Age: 57
End: 2022-03-08
Payer: COMMERCIAL

## 2022-03-08 VITALS
OXYGEN SATURATION: 97 % | DIASTOLIC BLOOD PRESSURE: 80 MMHG | HEIGHT: 70 IN | TEMPERATURE: 96.9 F | BODY MASS INDEX: 34.12 KG/M2 | WEIGHT: 238.3 LBS | HEART RATE: 84 BPM | SYSTOLIC BLOOD PRESSURE: 110 MMHG

## 2022-03-08 DIAGNOSIS — I26.99 BILATERAL PULMONARY EMBOLISM (HCC): ICD-10-CM

## 2022-03-08 DIAGNOSIS — F32.4 MAJOR DEPRESSIVE DISORDER WITH SINGLE EPISODE, IN PARTIAL REMISSION (HCC): ICD-10-CM

## 2022-03-08 DIAGNOSIS — K21.9 GASTROESOPHAGEAL REFLUX DISEASE WITHOUT ESOPHAGITIS: ICD-10-CM

## 2022-03-08 DIAGNOSIS — C55 MALIGNANT NEOPLASM OF UTERUS, UNSPECIFIED SITE (HCC): Primary | ICD-10-CM

## 2022-03-08 PROCEDURE — 99213 OFFICE O/P EST LOW 20 MIN: CPT | Performed by: INTERNAL MEDICINE

## 2022-03-08 RX ORDER — CITALOPRAM 20 MG/1
20 TABLET ORAL DAILY
Qty: 90 TABLET | Refills: 0 | Status: SHIPPED
Start: 2022-03-08 | End: 2022-06-08 | Stop reason: SDUPTHER

## 2022-03-08 RX ORDER — OMEPRAZOLE 40 MG/1
40 CAPSULE, DELAYED RELEASE ORAL DAILY
Qty: 90 CAPSULE | Refills: 0 | Status: SHIPPED
Start: 2022-03-08 | End: 2022-06-08 | Stop reason: SDUPTHER

## 2022-03-08 NOTE — PROGRESS NOTES
Chief Complaint   Patient presents with    Follow-up     from Dec 2021. lab work is from [facility] under [facility] from Feb 2022   Sigrid Crow Other     has labs from Feb 22, 2022 on her phone. and has recent ct scan from Feb 17 on her phone.  Other     had covid vaccines and did not have booster per Chemo doctor. had flu vaccine    Other     states she stopped taking losartan d/t feeling her blood pressure was getting to low and had feelings of faint states about a month ago. HPI:  Patient is here for follow-up of uterine cancer hypertension and gastroesophageal reflux disease. Patient has been receiving immunotherapy since beginning of the year and she has been tolerating it well. She had a repeat CT scan chest abdomen pelvis in February 2022 and showed stable nodules in the lung and stable lymph nodes and one of the lymph nodes in the pelvis has actually decreased in size. She is very content with the reports. She is reporting very minor side effects from the medication including nausea and sometimes vomiting. She appears to be in very good spirits. She went on disability. Blood work from February appears within normal range. She lost more than 20 pounds over the course of the last year trying to do a healthy diet so she was starting to have hypertension and she had stopped her blood pressure medication for the last month and her blood pressure today is very well controlled. .    Past Medical History, Surgical History, and Family History has been reviewed and updated.     Review of Systems:  Constitutional:  No fever, no fatigue, no chills, no headaches, no weight change  Dermatology:  No rash, no mole, no dry or sensitive skin  ENT:  No cough, no sore throat, no sinus pain, no runny nose, no ear pain  Cardiology:  No chest pain, no palpitations, no leg edema, no shortness of breath, no PND  Gastroenterology:  No dysphagia, no abdominal pain, no nausea, no vomiting, no constipation, no diarrhea, no heartburn  Musculoskeletal:  No joint pain, no leg cramps, no back pain, no muscle aches  Respiratory:  No shortness of breath, no orthopnea, no wheezing, no MAGALLON, no hemoptysis  Urology:  No blood in the urine, no urinary frequency, no urinary incontinence, no urinary urgency, no nocturia, no dysuria    Vitals:    03/08/22 0906   BP: 110/80   Site: Right Upper Arm   Position: Sitting   Cuff Size: Large Adult   Pulse: 84   Temp: 96.9 °F (36.1 °C)   SpO2: 97%   Weight: 238 lb 4.8 oz (108.1 kg)   Height: 5' 10\" (1.778 m)       General:  Patient alert and oriented x 3, NAD, pleasant  HEENT:  Atraumatic, normocephalic, PERRLA, EOMI, clear conjunctiva, TMs clear, nose-clear, throat - no erythema  Neck:  Supple, no goiter, no carotid bruits, no LAD  Lungs:  CTA   Heart:  RRR, no murmurs, gallops or rubs  Abdomen:  Soft/nt/nd, + bowel sounds  Extremities:  No clubbing, cyanosis or edema  Skin: unremarkable    Cholesterol, Total   Date Value Ref Range Status   02/01/2021 166 mg/dL Final     Triglycerides   Date Value Ref Range Status   02/01/2021 163 mg/dL Final     HDL   Date Value Ref Range Status   02/01/2021 46 35 - 70 mg/dL Final     LDL Calculated   Date Value Ref Range Status   02/01/2021 87 0 - 160 mg/dL Final     VLDL   Date Value Ref Range Status   02/01/2021 33 mg/dL Final     Chol/HDL Ratio   Date Value Ref Range Status   02/01/2021 3.6  Final     Sodium   Date Value Ref Range Status   08/06/2021 138 132 - 146 mmol/L Final     Potassium   Date Value Ref Range Status   08/06/2021 3.8 3.5 - 5.0 mmol/L Final     Chloride   Date Value Ref Range Status   08/06/2021 103 98 - 107 mmol/L Final     CO2   Date Value Ref Range Status   08/06/2021 25 22 - 29 mmol/L Final     BUN   Date Value Ref Range Status   08/06/2021 11 6 - 20 mg/dL Final     CREATININE   Date Value Ref Range Status   08/06/2021 0.6 0.5 - 1.0 mg/dL Final     Glucose   Date Value Ref Range Status   08/06/2021 102 (H) 74 - 99 mg/dL Final Calcium   Date Value Ref Range Status   08/06/2021 9.2 8.6 - 10.2 mg/dL Final     Total Protein   Date Value Ref Range Status   08/06/2021 7.3 6.4 - 8.3 g/dL Final     Albumin   Date Value Ref Range Status   08/06/2021 4.1 3.5 - 5.2 g/dL Final     Total Bilirubin   Date Value Ref Range Status   08/06/2021 <0.2 0.0 - 1.2 mg/dL Final     Alkaline Phosphatase   Date Value Ref Range Status   08/06/2021 70 35 - 104 U/L Final     AST   Date Value Ref Range Status   08/06/2021 31 0 - 31 U/L Final     ALT   Date Value Ref Range Status   08/06/2021 40 (H) 0 - 32 U/L Final     GFR Non-   Date Value Ref Range Status   08/06/2021 >60 >=60 mL/min/1.73 Final     Comment:     Chronic Kidney Disease: less than 60 ml/min/1.73 sq.m. Kidney Failure: less than 15 ml/min/1.73 sq.m. Results valid for patients 18 years and older. GFR    Date Value Ref Range Status   08/06/2021 >60  Final        No results found.      Assessment/Plan:      Outpatient Encounter Medications as of 3/8/2022   Medication Sig Dispense Refill    omeprazole (PRILOSEC) 40 MG delayed release capsule Take 1 capsule by mouth daily PRN 90 capsule 0    citalopram (CELEXA) 20 MG tablet Take 1 tablet by mouth daily 90 tablet 0    apixaban (ELIQUIS) 5 MG TABS tablet Take 1 tablet by mouth 2 times daily 180 tablet 0    docusate sodium (COLACE) 100 MG capsule Take 100 mg by mouth 2 times daily      Multiple Vitamin (MULTI-VITAMIN DAILY PO) Take by mouth      Psyllium 0.36 g CAPS Take by mouth 2 times daily      [DISCONTINUED] apixaban (ELIQUIS) 5 MG TABS tablet Take 1 tablet by mouth 2 times daily 180 tablet 0    [DISCONTINUED] citalopram (CELEXA) 20 MG tablet Take 1 tablet by mouth daily 90 tablet 0    [DISCONTINUED] losartan (COZAAR) 100 MG tablet Take 1 tablet by mouth daily (Patient not taking: Reported on 3/8/2022) 90 tablet 0    [DISCONTINUED] omeprazole (PRILOSEC) 40 MG delayed release capsule Take 1 capsule by mouth daily PRN 90 capsule 0    [DISCONTINUED] apixaban (ELIQUIS) 5 MG TABS tablet Take 1 tablet by mouth 2 times daily 180 tablet 0     No facility-administered encounter medications on file as of 3/8/2022. Patrica Murillo was seen today for follow-up, other, other and other. Diagnoses and all orders for this visit:    Malignant neoplasm of uterus, unspecified site ([facility] [address]    Gastroesophageal reflux disease without esophagitis  -     omeprazole (PRILOSEC) 40 MG delayed release capsule; Take 1 capsule by mouth daily PRN    Major depressive disorder with single episode, in partial remission (HCC)  -     citalopram (CELEXA) 20 MG tablet; Take 1 tablet by mouth daily    Bilateral pulmonary embolism (HCC)  -     apixaban (ELIQUIS) 5 MG TABS tablet; Take 1 tablet by mouth 2 times daily         There are no Patient Instructions on file for this visit. On this date 3/8/2022 I have spent 25 minutes reviewing previous notes, test results and face to face with the patient discussing the diagnosis and importance of compliance with the treatment plan as well as documenting on the day of the visit.       Rene Low MD   3/8/22

## 2022-06-07 LAB
ALBUMIN SERPL-MCNC: NORMAL G/DL
ALP BLD-CCNC: NORMAL U/L
ALT SERPL-CCNC: NORMAL U/L
ANION GAP SERPL CALCULATED.3IONS-SCNC: NORMAL MMOL/L
AST SERPL-CCNC: NORMAL U/L
BILIRUB SERPL-MCNC: NORMAL MG/DL
BUN / CREAT RATIO: NORMAL
BUN BLDV-MCNC: NORMAL MG/DL
BUN BLDV-MCNC: NORMAL MG/DL
CALCIUM SERPL-MCNC: NORMAL MG/DL
CHLORIDE BLD-SCNC: NORMAL MMOL/L
CO2: NORMAL
CREAT SERPL-MCNC: NORMAL MG/DL
CREAT SERPL-MCNC: NORMAL MG/DL
GFR CALCULATED: NORMAL
GLUCOSE BLD-MCNC: NORMAL MG/DL
POTASSIUM SERPL-SCNC: NORMAL MMOL/L
SODIUM BLD-SCNC: NORMAL MMOL/L
TOTAL PROTEIN: NORMAL
TSH SERPL DL<=0.05 MIU/L-ACNC: NORMAL M[IU]/L

## 2022-06-08 ENCOUNTER — OFFICE VISIT (OUTPATIENT)
Dept: PRIMARY CARE CLINIC | Age: 57
End: 2022-06-08
Payer: COMMERCIAL

## 2022-06-08 VITALS
WEIGHT: 242.1 LBS | OXYGEN SATURATION: 96 % | HEIGHT: 70 IN | TEMPERATURE: 96.9 F | DIASTOLIC BLOOD PRESSURE: 80 MMHG | SYSTOLIC BLOOD PRESSURE: 122 MMHG | BODY MASS INDEX: 34.66 KG/M2 | HEART RATE: 90 BPM

## 2022-06-08 DIAGNOSIS — R30.0 DYSURIA: Primary | ICD-10-CM

## 2022-06-08 DIAGNOSIS — F32.4 MAJOR DEPRESSIVE DISORDER WITH SINGLE EPISODE, IN PARTIAL REMISSION (HCC): ICD-10-CM

## 2022-06-08 DIAGNOSIS — I26.99 BILATERAL PULMONARY EMBOLISM (HCC): ICD-10-CM

## 2022-06-08 DIAGNOSIS — K21.9 GASTROESOPHAGEAL REFLUX DISEASE WITHOUT ESOPHAGITIS: ICD-10-CM

## 2022-06-08 DIAGNOSIS — N30.01 ACUTE CYSTITIS WITH HEMATURIA: ICD-10-CM

## 2022-06-08 LAB
BILIRUBIN, POC: NORMAL
BLOOD URINE, POC: NORMAL
CLARITY, POC: NORMAL
COLOR, POC: YELLOW
GLUCOSE URINE, POC: NORMAL
KETONES, POC: NORMAL
LEUKOCYTE EST, POC: NORMAL
NITRITE, POC: NORMAL
PH, POC: 7
PROTEIN, POC: NORMAL
SPECIFIC GRAVITY, POC: 1.02
UROBILINOGEN, POC: NORMAL

## 2022-06-08 PROCEDURE — G9899 SCRN MAM PERF RSLTS DOC: HCPCS | Performed by: INTERNAL MEDICINE

## 2022-06-08 PROCEDURE — 3017F COLORECTAL CA SCREEN DOC REV: CPT | Performed by: INTERNAL MEDICINE

## 2022-06-08 PROCEDURE — 81002 URINALYSIS NONAUTO W/O SCOPE: CPT | Performed by: INTERNAL MEDICINE

## 2022-06-08 PROCEDURE — G8427 DOCREV CUR MEDS BY ELIG CLIN: HCPCS | Performed by: INTERNAL MEDICINE

## 2022-06-08 PROCEDURE — 1036F TOBACCO NON-USER: CPT | Performed by: INTERNAL MEDICINE

## 2022-06-08 PROCEDURE — G8417 CALC BMI ABV UP PARAM F/U: HCPCS | Performed by: INTERNAL MEDICINE

## 2022-06-08 PROCEDURE — 99214 OFFICE O/P EST MOD 30 MIN: CPT | Performed by: INTERNAL MEDICINE

## 2022-06-08 RX ORDER — OMEPRAZOLE 40 MG/1
40 CAPSULE, DELAYED RELEASE ORAL DAILY
Qty: 90 CAPSULE | Refills: 0 | Status: SHIPPED | OUTPATIENT
Start: 2022-06-08

## 2022-06-08 RX ORDER — CIPROFLOXACIN 500 MG/1
500 TABLET, FILM COATED ORAL 2 TIMES DAILY
Qty: 20 TABLET | Refills: 0 | Status: SHIPPED | OUTPATIENT
Start: 2022-06-08 | End: 2022-06-18

## 2022-06-08 RX ORDER — CITALOPRAM 20 MG/1
20 TABLET ORAL DAILY
Qty: 90 TABLET | Refills: 0 | Status: SHIPPED
Start: 2022-06-08 | End: 2022-06-21

## 2022-06-08 ASSESSMENT — PATIENT HEALTH QUESTIONNAIRE - PHQ9
5. POOR APPETITE OR OVEREATING: 1
5. POOR APPETITE OR OVEREATING: 1
SUM OF ALL RESPONSES TO PHQ QUESTIONS 1-9: 12
SUM OF ALL RESPONSES TO PHQ QUESTIONS 1-9: 8
SUM OF ALL RESPONSES TO PHQ QUESTIONS 1-9: 12
8. MOVING OR SPEAKING SO SLOWLY THAT OTHER PEOPLE COULD HAVE NOTICED. OR THE OPPOSITE, BEING SO FIGETY OR RESTLESS THAT YOU HAVE BEEN MOVING AROUND A LOT MORE THAN USUAL: 3
7. TROUBLE CONCENTRATING ON THINGS, SUCH AS READING THE NEWSPAPER OR WATCHING TELEVISION: 3
SUM OF ALL RESPONSES TO PHQ QUESTIONS 1-9: 8
1. LITTLE INTEREST OR PLEASURE IN DOING THINGS: 1
9. THOUGHTS THAT YOU WOULD BE BETTER OFF DEAD, OR OF HURTING YOURSELF: 0
8. MOVING OR SPEAKING SO SLOWLY THAT OTHER PEOPLE COULD HAVE NOTICED. OR THE OPPOSITE, BEING SO FIGETY OR RESTLESS THAT YOU HAVE BEEN MOVING AROUND A LOT MORE THAN USUAL: 0
SUM OF ALL RESPONSES TO PHQ9 QUESTIONS 1 & 2: 2
4. FEELING TIRED OR HAVING LITTLE ENERGY: 3
7. TROUBLE CONCENTRATING ON THINGS, SUCH AS READING THE NEWSPAPER OR WATCHING TELEVISION: 0
6. FEELING BAD ABOUT YOURSELF - OR THAT YOU ARE A FAILURE OR HAVE LET YOURSELF OR YOUR FAMILY DOWN: 0
3. TROUBLE FALLING OR STAYING ASLEEP: 1
3. TROUBLE FALLING OR STAYING ASLEEP: 0
1. LITTLE INTEREST OR PLEASURE IN DOING THINGS: 1
SUM OF ALL RESPONSES TO PHQ QUESTIONS 1-9: 8
2. FEELING DOWN, DEPRESSED OR HOPELESS: 1
2. FEELING DOWN, DEPRESSED OR HOPELESS: 1
4. FEELING TIRED OR HAVING LITTLE ENERGY: 3
SUM OF ALL RESPONSES TO PHQ9 QUESTIONS 1 & 2: 2
SUM OF ALL RESPONSES TO PHQ QUESTIONS 1-9: 12
9. THOUGHTS THAT YOU WOULD BE BETTER OFF DEAD, OR OF HURTING YOURSELF: 0
SUM OF ALL RESPONSES TO PHQ QUESTIONS 1-9: 8
10. IF YOU CHECKED OFF ANY PROBLEMS, HOW DIFFICULT HAVE THESE PROBLEMS MADE IT FOR YOU TO DO YOUR WORK, TAKE CARE OF THINGS AT HOME, OR GET ALONG WITH OTHER PEOPLE: 1
6. FEELING BAD ABOUT YOURSELF - OR THAT YOU ARE A FAILURE OR HAVE LET YOURSELF OR YOUR FAMILY DOWN: 1
SUM OF ALL RESPONSES TO PHQ QUESTIONS 1-9: 12
10. IF YOU CHECKED OFF ANY PROBLEMS, HOW DIFFICULT HAVE THESE PROBLEMS MADE IT FOR YOU TO DO YOUR WORK, TAKE CARE OF THINGS AT HOME, OR GET ALONG WITH OTHER PEOPLE: 1

## 2022-06-08 ASSESSMENT — ANXIETY QUESTIONNAIRES
1. FEELING NERVOUS, ANXIOUS, OR ON EDGE: 0
5. BEING SO RESTLESS THAT IT IS HARD TO SIT STILL: 0
GAD7 TOTAL SCORE: 0
IF YOU CHECKED OFF ANY PROBLEMS ON THIS QUESTIONNAIRE, HOW DIFFICULT HAVE THESE PROBLEMS MADE IT FOR YOU TO DO YOUR WORK, TAKE CARE OF THINGS AT HOME, OR GET ALONG WITH OTHER PEOPLE: NOT DIFFICULT AT ALL
2. NOT BEING ABLE TO STOP OR CONTROL WORRYING: 0
7. FEELING AFRAID AS IF SOMETHING AWFUL MIGHT HAPPEN: 0
4. TROUBLE RELAXING: 0
6. BECOMING EASILY ANNOYED OR IRRITABLE: 0
3. WORRYING TOO MUCH ABOUT DIFFERENT THINGS: 0

## 2022-06-08 NOTE — PROGRESS NOTES
Chief Complaint   Patient presents with    3 Month Follow-Up     Pt is here as a routine visit and states she had labs drawn yesterday at the [facility] and has no current c/o discomfort voiced. HPI:  Patient is here for follow-up of uterine cancer, depression and gastroesophageal flux disease. Patient is doing very well appears to be in good spirits. Blood work was within normal limits including thyroid functions renal functions and LFTs. She has been maintained on Keytruda. She had a CT scan of the abdomen and pelvis and chest which showed stable appearance since February without any new lymphadenopathy or opacities. She has been following a low-fat low carbohydrate diet she has been doing very well with that. Her blood pressure has been very stable off antihypertensives. Patient complains of increased frequency of urination and burning when she urinates that started just yesterday. She denies any fevers or chills or any abdominal pains or any nausea or vomiting. UA showed large leukocyte zachary and blood. .    Past Medical History, Surgical History, and Family History has been reviewed and updated.     Review of Systems:  Constitutional:  No fever, no fatigue, no chills, no headaches, no weight change  Dermatology:  No rash, no mole, no dry or sensitive skin  ENT:  No cough, no sore throat, no sinus pain, no runny nose, no ear pain  Cardiology:  No chest pain, no palpitations, no leg edema, no shortness of breath, no PND  Gastroenterology:  No dysphagia, no abdominal pain, no nausea, no vomiting, no constipation, no diarrhea, no heartburn  Musculoskeletal:  No joint pain, no leg cramps, no back pain, no muscle aches  Respiratory:  No shortness of breath, no orthopnea, no wheezing, no MAGALLON, no hemoptysis  Urology:  No blood in the urine, no urinary frequency, no urinary incontinence, no urinary urgency, no nocturia, no dysuria    Vitals:    06/08/22 0905   BP: 122/80   Pulse: 90   Temp: 96.9 °F Date Value Ref Range Status   08/06/2021 31 0 - 31 U/L Final     ALT   Date Value Ref Range Status   08/06/2021 40 (H) 0 - 32 U/L Final     GFR Non-   Date Value Ref Range Status   08/06/2021 >60 >=60 mL/min/1.73 Final     Comment:     Chronic Kidney Disease: less than 60 ml/min/1.73 sq.m. Kidney Failure: less than 15 ml/min/1.73 sq.m. Results valid for patients 18 years and older. GFR    Date Value Ref Range Status   08/06/2021 >60  Final        No results found. Assessment/Plan:      Outpatient Encounter Medications as of 6/8/2022   Medication Sig Dispense Refill    omeprazole (PRILOSEC) 40 MG delayed release capsule Take 1 capsule by mouth daily PRN 90 capsule 0    citalopram (CELEXA) 20 mg tablet Take 1 tablet by mouth daily 90 tablet 0    apixaban (ELIQUIS) 5 MG TABS tablet Take 1 tablet by mouth 2 times daily 180 tablet 0    ciprofloxacin (CIPRO) 500 mg tablet Take 1 tablet by mouth 2 times daily for 10 days 20 tablet 0    docusate sodium (COLACE) 100 MG capsule Take 100 mg by mouth 2 times daily      Multiple Vitamin (MULTI-VITAMIN DAILY PO) Take by mouth      Psyllium 0.36 g CAPS Take by mouth 2 times daily      [DISCONTINUED] omeprazole (PRILOSEC) 40 MG delayed release capsule Take 1 capsule by mouth daily PRN 90 capsule 0    [DISCONTINUED] citalopram (CELEXA) 20 MG tablet Take 1 tablet by mouth daily 90 tablet 0    [DISCONTINUED] apixaban (ELIQUIS) 5 MG TABS tablet Take 1 tablet by mouth 2 times daily 180 tablet 0    [DISCONTINUED] apixaban (ELIQUIS) 5 MG TABS tablet Take 1 tablet by mouth 2 times daily 180 tablet 0     No facility-administered encounter medications on file as of 6/8/2022. Nicanor Murphy was seen today for 3 month follow-up. Diagnoses and all orders for this visit:    Dysuria  -     POCT Urinalysis no Micro  -     ciprofloxacin (CIPRO) 500 mg tablet;  Take 1 tablet by mouth 2 times daily for 10 days    Gastroesophageal reflux disease without esophagitis  -     omeprazole (PRILOSEC) 40 MG delayed release capsule; Take 1 capsule by mouth daily PRN    Major depressive disorder with single episode, in partial remission (HCC)  -     citalopram (CELEXA) 20 mg tablet; Take 1 tablet by mouth daily    Bilateral pulmonary embolism (HCC)  -     apixaban (ELIQUIS) 5 MG TABS tablet; Take 1 tablet by mouth 2 times daily    Acute cystitis with hematuria       Cipro 500 mg twice a day  We will repeat UA in 2 weeks    There are no Patient Instructions on file for this visit. On this date 6/8/2022 I have spent 30 minutes reviewing previous notes, test results and face to face with the patient discussing the diagnosis and importance of compliance with the treatment plan as well as documenting on the day of the visit.       Fatoumata Goldstein MD   6/8/22

## 2022-06-09 DIAGNOSIS — R30.0 DYSURIA: ICD-10-CM

## 2022-06-09 DIAGNOSIS — N30.01 ACUTE CYSTITIS WITH HEMATURIA: ICD-10-CM

## 2022-06-11 LAB — URINE CULTURE, ROUTINE: NORMAL

## 2022-06-21 ENCOUNTER — OFFICE VISIT (OUTPATIENT)
Dept: PRIMARY CARE CLINIC | Age: 57
End: 2022-06-21
Payer: COMMERCIAL

## 2022-06-21 VITALS
WEIGHT: 241.2 LBS | HEART RATE: 94 BPM | TEMPERATURE: 96.8 F | OXYGEN SATURATION: 96 % | HEIGHT: 70 IN | BODY MASS INDEX: 34.53 KG/M2 | DIASTOLIC BLOOD PRESSURE: 76 MMHG | SYSTOLIC BLOOD PRESSURE: 124 MMHG

## 2022-06-21 DIAGNOSIS — Z12.31 BREAST CANCER SCREENING BY MAMMOGRAM: ICD-10-CM

## 2022-06-21 DIAGNOSIS — F32.4 MAJOR DEPRESSIVE DISORDER WITH SINGLE EPISODE, IN PARTIAL REMISSION (HCC): Primary | ICD-10-CM

## 2022-06-21 PROCEDURE — 99213 OFFICE O/P EST LOW 20 MIN: CPT | Performed by: INTERNAL MEDICINE

## 2022-06-21 PROCEDURE — G8427 DOCREV CUR MEDS BY ELIG CLIN: HCPCS | Performed by: INTERNAL MEDICINE

## 2022-06-21 PROCEDURE — G9899 SCRN MAM PERF RSLTS DOC: HCPCS | Performed by: INTERNAL MEDICINE

## 2022-06-21 PROCEDURE — 3017F COLORECTAL CA SCREEN DOC REV: CPT | Performed by: INTERNAL MEDICINE

## 2022-06-21 PROCEDURE — G8417 CALC BMI ABV UP PARAM F/U: HCPCS | Performed by: INTERNAL MEDICINE

## 2022-06-21 PROCEDURE — 1036F TOBACCO NON-USER: CPT | Performed by: INTERNAL MEDICINE

## 2022-06-21 RX ORDER — CITALOPRAM 40 MG/1
40 TABLET ORAL DAILY
Qty: 90 TABLET | Refills: 0
Start: 2022-06-21 | End: 2022-09-21 | Stop reason: SDUPTHER

## 2022-06-21 NOTE — PROGRESS NOTES
Chief Complaint   Patient presents with    Dysuria     Pt is here as a 2 week F/U, for a UTI. Pt states she is feeling better at this time, and has no other c/o discomfort at this time. Pt has questions about her depression medications to discuss. HPI:  Patient is here for follow-up of acute urinary tract infection and cystitis with hematuria. She is feeling a lot better. Frequency and urgency has completely resolved. Urine culture was positive for mixed westley. Patient complains of increased depressive mood lately and she would like to either increase her Celexa or start back on the Wellbutrin. She was counseled and we will increase her Celexa gradually to reach 40 mg daily and will reassess afterwards. She denies any suicidal ideation or increased crying spells but she has been under a lot of stress with her sons issues. .    Past Medical History, Surgical History, and Family History has been reviewed and updated.     Review of Systems:  Constitutional:  No fever, no fatigue, no chills, no headaches, no weight change  Dermatology:  No rash, no mole, no dry or sensitive skin  ENT:  No cough, no sore throat, no sinus pain, no runny nose, no ear pain  Cardiology:  No chest pain, no palpitations, no leg edema, no shortness of breath, no PND  Gastroenterology:  No dysphagia, no abdominal pain, no nausea, no vomiting, no constipation, no diarrhea, no heartburn  Musculoskeletal:  No joint pain, no leg cramps, no back pain, no muscle aches  Respiratory:  No shortness of breath, no orthopnea, no wheezing, no MAGALLON, no hemoptysis  Urology:  No blood in the urine, no urinary frequency, no urinary incontinence, no urinary urgency, no nocturia, no dysuria    Vitals:    06/21/22 0907   BP: 124/76   Pulse: 94   Temp: 96.8 °F (36 °C)   TempSrc: Temporal   SpO2: 96%   Weight: 241 lb 3.2 oz (109.4 kg)   Height: 5' 10\" (1.778 m)       General:  Patient alert and oriented x 3, NAD, pleasant  HEENT:  Atraumatic, normocephalic, PERRLA, EOMI, clear conjunctiva, TMs clear, nose-clear, throat - no erythema  Neck:  Supple, no goiter, no carotid bruits, no LAD  Lungs:  CTA   Heart:  RRR, no murmurs, gallops or rubs  Abdomen:  Soft/nt/nd, + bowel sounds  Extremities:  No clubbing, cyanosis or edema  Skin: unremarkable    Cholesterol, Total   Date Value Ref Range Status   02/01/2021 166 mg/dL Final     Triglycerides   Date Value Ref Range Status   02/01/2021 163 mg/dL Final     HDL   Date Value Ref Range Status   02/01/2021 46 35 - 70 mg/dL Final     LDL Calculated   Date Value Ref Range Status   02/01/2021 87 0 - 160 mg/dL Final     VLDL   Date Value Ref Range Status   02/01/2021 33 mg/dL Final     Chol/HDL Ratio   Date Value Ref Range Status   02/01/2021 3.6  Final     Sodium   Date Value Ref Range Status   08/06/2021 138 132 - 146 mmol/L Final     Potassium   Date Value Ref Range Status   08/06/2021 3.8 3.5 - 5.0 mmol/L Final     Chloride   Date Value Ref Range Status   08/06/2021 103 98 - 107 mmol/L Final     CO2   Date Value Ref Range Status   08/06/2021 25 22 - 29 mmol/L Final     BUN   Date Value Ref Range Status   08/06/2021 11 6 - 20 mg/dL Final     CREATININE   Date Value Ref Range Status   08/06/2021 0.6 0.5 - 1.0 mg/dL Final     Glucose   Date Value Ref Range Status   08/06/2021 102 (H) 74 - 99 mg/dL Final     Calcium   Date Value Ref Range Status   08/06/2021 9.2 8.6 - 10.2 mg/dL Final     Total Protein   Date Value Ref Range Status   08/06/2021 7.3 6.4 - 8.3 g/dL Final     Albumin   Date Value Ref Range Status   08/06/2021 4.1 3.5 - 5.2 g/dL Final     Total Bilirubin   Date Value Ref Range Status   08/06/2021 <0.2 0.0 - 1.2 mg/dL Final     Alkaline Phosphatase   Date Value Ref Range Status   08/06/2021 70 35 - 104 U/L Final     AST   Date Value Ref Range Status   08/06/2021 31 0 - 31 U/L Final     ALT   Date Value Ref Range Status   08/06/2021 40 (H) 0 - 32 U/L Final     GFR Non-   Date Value Ref Range Status   08/06/2021 >60 >=60 mL/min/1.73 Final     Comment:     Chronic Kidney Disease: less than 60 ml/min/1.73 sq.m. Kidney Failure: less than 15 ml/min/1.73 sq.m. Results valid for patients 18 years and older. GFR    Date Value Ref Range Status   08/06/2021 >60  Final        No results found. Assessment/Plan:      Outpatient Encounter Medications as of 6/21/2022   Medication Sig Dispense Refill    citalopram (CELEXA) 40 MG tablet Take 1 tablet by mouth daily 90 tablet 0    omeprazole (PRILOSEC) 40 MG delayed release capsule Take 1 capsule by mouth daily PRN 90 capsule 0    apixaban (ELIQUIS) 5 MG TABS tablet Take 1 tablet by mouth 2 times daily 180 tablet 0    docusate sodium (COLACE) 100 MG capsule Take 100 mg by mouth 2 times daily      Multiple Vitamin (MULTI-VITAMIN DAILY PO) Take by mouth      Psyllium 0.36 g CAPS Take by mouth 2 times daily      [DISCONTINUED] citalopram (CELEXA) 20 mg tablet Take 1 tablet by mouth daily 90 tablet 0    [DISCONTINUED] apixaban (ELIQUIS) 5 MG TABS tablet Take 1 tablet by mouth 2 times daily 180 tablet 0     No facility-administered encounter medications on file as of 6/21/2022. Annamarie Watkins was seen today for dysuria. Diagnoses and all orders for this visit:    Major depressive disorder with single episode, in partial remission ([facility] [address]  -     citalopram (CELEXA) 40 MG tablet; Take 1 tablet by mouth daily    Breast cancer screening by mammogram  -     DESTINI DIGITAL SCREEN W OR WO CAD BILATERAL; Future         There are no Patient Instructions on file for this visit. On this date 6/21/2022 I have spent 25 minutes reviewing previous notes, test results and face to face with the patient discussing the diagnosis and importance of compliance with the treatment plan as well as documenting on the day of the visit.       Fatoumata Goldstein MD   6/21/22

## 2022-08-16 LAB — MAMMOGRAPHY, EXTERNAL: NORMAL

## 2022-09-20 LAB
ALBUMIN SERPL-MCNC: NORMAL G/DL
ALP BLD-CCNC: NORMAL U/L
ALT SERPL-CCNC: NORMAL U/L
ANION GAP SERPL CALCULATED.3IONS-SCNC: NORMAL MMOL/L
AST SERPL-CCNC: NORMAL U/L
BILIRUB SERPL-MCNC: NORMAL MG/DL
BUN BLDV-MCNC: NORMAL MG/DL
CA 125: NORMAL
CA 125: NORMAL
CALCIUM SERPL-MCNC: NORMAL MG/DL
CHLORIDE BLD-SCNC: NORMAL MMOL/L
CO2: NORMAL
CREAT SERPL-MCNC: NORMAL MG/DL
GFR CALCULATED: NORMAL
GLUCOSE BLD-MCNC: NORMAL MG/DL
POTASSIUM SERPL-SCNC: NORMAL MMOL/L
SODIUM BLD-SCNC: NORMAL MMOL/L
TOTAL PROTEIN: NORMAL

## 2022-09-21 ENCOUNTER — OFFICE VISIT (OUTPATIENT)
Dept: PRIMARY CARE CLINIC | Age: 57
End: 2022-09-21
Payer: COMMERCIAL

## 2022-09-21 ENCOUNTER — TELEPHONE (OUTPATIENT)
Dept: PRIMARY CARE CLINIC | Age: 57
End: 2022-09-21

## 2022-09-21 VITALS
HEIGHT: 70 IN | TEMPERATURE: 97.1 F | HEART RATE: 87 BPM | SYSTOLIC BLOOD PRESSURE: 120 MMHG | OXYGEN SATURATION: 97 % | BODY MASS INDEX: 33.74 KG/M2 | WEIGHT: 235.7 LBS | RESPIRATION RATE: 16 BRPM | DIASTOLIC BLOOD PRESSURE: 86 MMHG

## 2022-09-21 DIAGNOSIS — Z23 NEEDS FLU SHOT: ICD-10-CM

## 2022-09-21 DIAGNOSIS — F32.4 MAJOR DEPRESSIVE DISORDER WITH SINGLE EPISODE, IN PARTIAL REMISSION (HCC): ICD-10-CM

## 2022-09-21 DIAGNOSIS — Z95.828 PRESENCE OF IVC FILTER: ICD-10-CM

## 2022-09-21 DIAGNOSIS — C54.1 ENDOMETRIAL CANCER, FIGO STAGE IVB (HCC): ICD-10-CM

## 2022-09-21 DIAGNOSIS — F32.4 MAJOR DEPRESSIVE DISORDER WITH SINGLE EPISODE, IN PARTIAL REMISSION (HCC): Primary | ICD-10-CM

## 2022-09-21 DIAGNOSIS — I26.99 BILATERAL PULMONARY EMBOLISM (HCC): ICD-10-CM

## 2022-09-21 PROCEDURE — 3017F COLORECTAL CA SCREEN DOC REV: CPT | Performed by: INTERNAL MEDICINE

## 2022-09-21 PROCEDURE — 1036F TOBACCO NON-USER: CPT | Performed by: INTERNAL MEDICINE

## 2022-09-21 PROCEDURE — G9899 SCRN MAM PERF RSLTS DOC: HCPCS | Performed by: INTERNAL MEDICINE

## 2022-09-21 PROCEDURE — 99214 OFFICE O/P EST MOD 30 MIN: CPT | Performed by: INTERNAL MEDICINE

## 2022-09-21 PROCEDURE — G8417 CALC BMI ABV UP PARAM F/U: HCPCS | Performed by: INTERNAL MEDICINE

## 2022-09-21 PROCEDURE — G8427 DOCREV CUR MEDS BY ELIG CLIN: HCPCS | Performed by: INTERNAL MEDICINE

## 2022-09-21 PROCEDURE — 90674 CCIIV4 VAC NO PRSV 0.5 ML IM: CPT | Performed by: INTERNAL MEDICINE

## 2022-09-21 RX ORDER — CITALOPRAM 20 MG/1
40 TABLET ORAL DAILY
Qty: 90 TABLET | Refills: 0 | Status: SHIPPED
Start: 2022-09-21 | End: 2022-09-21 | Stop reason: SDUPTHER

## 2022-09-21 RX ORDER — CITALOPRAM 20 MG/1
20 TABLET ORAL DAILY
Qty: 90 TABLET | Refills: 0 | Status: SHIPPED | OUTPATIENT
Start: 2022-09-21

## 2022-09-21 RX ORDER — BUPROPION HYDROCHLORIDE 150 MG/1
150 TABLET ORAL EVERY MORNING
Qty: 90 TABLET | Refills: 1 | Status: SHIPPED | OUTPATIENT
Start: 2022-09-21

## 2022-09-21 NOTE — PROGRESS NOTES
Chief Complaint   Patient presents with    Depression     Patient here for 3 month follow up. Would like to discuss depression medications. Mass     States she has a bruise on her stomach with a lump in it. She does not remember hitting anything. HPI:  Patient is here for follow-up of uterine cancer and depression. Patient is doing well, compliant with medications. Blood work was discussed with patient all appears within reasonable range dated 9/20/2022  PET scan done in August showed only 2 abdominal lymph nodes that has been there without any new lesions. She is maintained on the Adonis Gazella. .  Patient complains of depression and would like to resume the Wellbutrin and decrease the Celexa dose. She is also trying to watch her diet very well and she lost about 5 pounds. She is also complaining about daily headache that she has been having which is more on the forehead around noontime and she has been taking some Excedrin Migraine over-the-counter which has been helping. Her blood pressure has been very well controlled lately off the medications and today it is within the normal range. She was counseled that this might be secondary to sinus congestion and to try some Claritin over-the-counter or Tylenol extra strength twice a day. She is relating the headaches since she started chemotherapy so she was advised that this might be also a side effect from the chemotherapy. she is also complaining about some bruising on the abdominal wall with a small lipoma that is fibrosed and subcutaneous. She was advised to use a heating pad  She is due for her flu vaccine. her last mammogram was July 2022. Past Medical History, Surgical History, and Family History has been reviewed and updated.     Review of Systems:  Constitutional:  No fever, no fatigue, no chills, no headaches, no weight change  Dermatology:  No rash, no mole, no dry or sensitive skin  ENT:  No cough, no sore throat, no sinus pain, no runny nose, no ear pain  Cardiology:  No chest pain, no palpitations, no leg edema, no shortness of breath, no PND  Gastroenterology:  No dysphagia, no abdominal pain, no nausea, no vomiting, no constipation, no diarrhea, no heartburn  Musculoskeletal:  No joint pain, no leg cramps, no back pain, no muscle aches  Respiratory:  No shortness of breath, no orthopnea, no wheezing, no MAGALLON, no hemoptysis  Urology:  No blood in the urine, no urinary frequency, no urinary incontinence, no urinary urgency, no nocturia, no dysuria    Vitals:    09/21/22 0914   BP: 120/86   Pulse: 87   Resp: 16   Temp: 97.1 °F (36.2 °C)   SpO2: 97%   Weight: 235 lb 11.2 oz (106.9 kg)   Height: 5' 10\" (1.778 m)       General:  Patient alert and oriented x 3, NAD, pleasant  HEENT:  Atraumatic, normocephalic, PERRLA, EOMI, clear conjunctiva, TMs clear, nose-clear, throat - no erythema  Neck:  Supple, no goiter, no carotid bruits, no LAD  Lungs:  CTA   Heart:  RRR, no murmurs, gallops or rubs  Abdomen:  Soft/nt/nd, + bowel sounds  Extremities:  No clubbing, cyanosis or edema  Skin: unremarkable    Cholesterol, Total   Date Value Ref Range Status   02/01/2021 166 mg/dL Final     Triglycerides   Date Value Ref Range Status   02/01/2021 163 mg/dL Final     HDL   Date Value Ref Range Status   02/01/2021 46 35 - 70 mg/dL Final     LDL Calculated   Date Value Ref Range Status   02/01/2021 87 0 - 160 mg/dL Final     VLDL   Date Value Ref Range Status   02/01/2021 33 mg/dL Final     Chol/HDL Ratio   Date Value Ref Range Status   02/01/2021 3.6  Final     Sodium   Date Value Ref Range Status   08/06/2021 138 132 - 146 mmol/L Final     Potassium   Date Value Ref Range Status   08/06/2021 3.8 3.5 - 5.0 mmol/L Final     Chloride   Date Value Ref Range Status   08/06/2021 103 98 - 107 mmol/L Final     CO2   Date Value Ref Range Status   08/06/2021 25 22 - 29 mmol/L Final     BUN   Date Value Ref Range Status   08/06/2021 11 6 - 20 mg/dL Final     Creatinine by mouth 2 times daily 180 tablet 0     No facility-administered encounter medications on file as of 9/21/2022. Elsie Zacarias was seen today for depression and mass. Diagnoses and all orders for this visit:    Major depressive disorder with single episode, in partial remission (HCC)  -     citalopram (CELEXA) 20 MG tablet; Take 2 tablets by mouth daily    Needs flu shot  -     Influenza, FLUCELVAX, (age 10 mo+), IM, PF, 0.5 mL    Endometrial cancer, FIGO stage IVB (HCC)    Presence of IVC filter    Other orders  -     buPROPion (WELLBUTRIN XL) 150 MG extended release tablet; Take 1 tablet by mouth every morning     Start Wellbutrin 150 mg extended release daily  Decrease Celexa to 20 mg daily  Tylenol extra strength twice a day  There are no Patient Instructions on file for this visit. On this date 9/21/2022 I have spent 30 minutes reviewing previous notes, test results and face to face with the patient discussing the diagnosis and importance of compliance with the treatment plan as well as documenting on the day of the visit.       Rachel Dukes MD   9/21/22

## 2022-12-13 LAB

## 2022-12-14 ENCOUNTER — OFFICE VISIT (OUTPATIENT)
Dept: PRIMARY CARE CLINIC | Age: 57
End: 2022-12-14
Payer: COMMERCIAL

## 2022-12-14 VITALS
OXYGEN SATURATION: 97 % | DIASTOLIC BLOOD PRESSURE: 78 MMHG | SYSTOLIC BLOOD PRESSURE: 130 MMHG | HEIGHT: 70 IN | HEART RATE: 86 BPM | TEMPERATURE: 96.9 F | WEIGHT: 227.5 LBS | BODY MASS INDEX: 32.57 KG/M2

## 2022-12-14 DIAGNOSIS — Z11.59 NEED FOR HEPATITIS C SCREENING TEST: ICD-10-CM

## 2022-12-14 DIAGNOSIS — F32.4 MAJOR DEPRESSIVE DISORDER WITH SINGLE EPISODE, IN PARTIAL REMISSION (HCC): ICD-10-CM

## 2022-12-14 DIAGNOSIS — I35.0 AORTIC STENOSIS, MILD: Primary | ICD-10-CM

## 2022-12-14 DIAGNOSIS — I26.99 BILATERAL PULMONARY EMBOLISM (HCC): ICD-10-CM

## 2022-12-14 DIAGNOSIS — K21.9 GASTROESOPHAGEAL REFLUX DISEASE WITHOUT ESOPHAGITIS: ICD-10-CM

## 2022-12-14 PROCEDURE — 3017F COLORECTAL CA SCREEN DOC REV: CPT | Performed by: INTERNAL MEDICINE

## 2022-12-14 PROCEDURE — 99213 OFFICE O/P EST LOW 20 MIN: CPT | Performed by: INTERNAL MEDICINE

## 2022-12-14 PROCEDURE — 3078F DIAST BP <80 MM HG: CPT | Performed by: INTERNAL MEDICINE

## 2022-12-14 PROCEDURE — G8427 DOCREV CUR MEDS BY ELIG CLIN: HCPCS | Performed by: INTERNAL MEDICINE

## 2022-12-14 PROCEDURE — G8417 CALC BMI ABV UP PARAM F/U: HCPCS | Performed by: INTERNAL MEDICINE

## 2022-12-14 PROCEDURE — G8482 FLU IMMUNIZE ORDER/ADMIN: HCPCS | Performed by: INTERNAL MEDICINE

## 2022-12-14 PROCEDURE — G9899 SCRN MAM PERF RSLTS DOC: HCPCS | Performed by: INTERNAL MEDICINE

## 2022-12-14 PROCEDURE — 1036F TOBACCO NON-USER: CPT | Performed by: INTERNAL MEDICINE

## 2022-12-14 PROCEDURE — 3074F SYST BP LT 130 MM HG: CPT | Performed by: INTERNAL MEDICINE

## 2022-12-14 RX ORDER — BUPROPION HYDROCHLORIDE 150 MG/1
150 TABLET ORAL EVERY MORNING
Qty: 90 TABLET | Refills: 1 | Status: CANCELLED | OUTPATIENT
Start: 2022-12-14

## 2022-12-14 RX ORDER — CITALOPRAM 20 MG/1
TABLET ORAL
Qty: 90 TABLET | Refills: 0 | OUTPATIENT
Start: 2022-12-14

## 2022-12-14 RX ORDER — OMEPRAZOLE 40 MG/1
40 CAPSULE, DELAYED RELEASE ORAL DAILY
Qty: 90 CAPSULE | Refills: 0 | Status: CANCELLED | OUTPATIENT
Start: 2022-12-14

## 2022-12-14 RX ORDER — CITALOPRAM 20 MG/1
20 TABLET ORAL DAILY
Qty: 90 TABLET | Refills: 0 | Status: SHIPPED | OUTPATIENT
Start: 2022-12-14

## 2022-12-14 SDOH — ECONOMIC STABILITY: FOOD INSECURITY: WITHIN THE PAST 12 MONTHS, YOU WORRIED THAT YOUR FOOD WOULD RUN OUT BEFORE YOU GOT MONEY TO BUY MORE.: NEVER TRUE

## 2022-12-14 SDOH — ECONOMIC STABILITY: FOOD INSECURITY: WITHIN THE PAST 12 MONTHS, THE FOOD YOU BOUGHT JUST DIDN'T LAST AND YOU DIDN'T HAVE MONEY TO GET MORE.: NEVER TRUE

## 2022-12-14 ASSESSMENT — SOCIAL DETERMINANTS OF HEALTH (SDOH): HOW HARD IS IT FOR YOU TO PAY FOR THE VERY BASICS LIKE FOOD, HOUSING, MEDICAL CARE, AND HEATING?: NOT HARD AT ALL

## 2022-12-14 NOTE — PROGRESS NOTES
blood in the urine, no urinary frequency, no urinary incontinence, no urinary urgency, no nocturia, no dysuria    Vitals:    12/14/22 0904   BP: 130/78   Pulse: 86   Temp: 96.9 °F (36.1 °C)   TempSrc: Temporal   SpO2: 97%   Weight: 227 lb 8 oz (103.2 kg)   Height: 5' 10\" (1.778 m)       General:  Patient alert and oriented x 3, NAD, pleasant  HEENT:  Atraumatic, normocephalic, PERRLA, EOMI, clear conjunctiva, TMs clear, nose-clear, throat - no erythema  Neck:  Supple, no goiter, no carotid bruits, no LAD  Lungs:  CTA   Heart:  RRR, positive systolic murmur on the second left sternal border, no gallops or rubs  Abdomen:  Soft/nt/nd, + bowel sounds  Lymph node examination: unremarkable  Neurological exam : unremarkable  Extremities:  No clubbing, cyanosis or edema  Skin: unremarkable    Cholesterol, Total   Date Value Ref Range Status   02/01/2021 166 mg/dL Final     Triglycerides   Date Value Ref Range Status   02/01/2021 163 mg/dL Final     HDL   Date Value Ref Range Status   02/01/2021 46 35 - 70 mg/dL Final     LDL Calculated   Date Value Ref Range Status   02/01/2021 87 0 - 160 mg/dL Final     VLDL   Date Value Ref Range Status   02/01/2021 33 mg/dL Final     Chol/HDL Ratio   Date Value Ref Range Status   02/01/2021 3.6  Final     Sodium   Date Value Ref Range Status   08/06/2021 138 132 - 146 mmol/L Final     Potassium   Date Value Ref Range Status   08/06/2021 3.8 3.5 - 5.0 mmol/L Final     Chloride   Date Value Ref Range Status   08/06/2021 103 98 - 107 mmol/L Final     CO2   Date Value Ref Range Status   08/06/2021 25 22 - 29 mmol/L Final     BUN   Date Value Ref Range Status   08/06/2021 11 6 - 20 mg/dL Final     Creatinine   Date Value Ref Range Status   08/06/2021 0.6 0.5 - 1.0 mg/dL Final     Glucose   Date Value Ref Range Status   08/06/2021 102 (H) 74 - 99 mg/dL Final     Calcium   Date Value Ref Range Status   08/06/2021 9.2 8.6 - 10.2 mg/dL Final     Total Protein   Date Value Ref Range Status 08/06/2021 7.3 6.4 - 8.3 g/dL Final     Albumin   Date Value Ref Range Status   08/06/2021 4.1 3.5 - 5.2 g/dL Final     Total Bilirubin   Date Value Ref Range Status   08/06/2021 <0.2 0.0 - 1.2 mg/dL Final     Alkaline Phosphatase   Date Value Ref Range Status   08/06/2021 70 35 - 104 U/L Final     AST   Date Value Ref Range Status   08/06/2021 31 0 - 31 U/L Final     ALT   Date Value Ref Range Status   08/06/2021 40 (H) 0 - 32 U/L Final     GFR Non-   Date Value Ref Range Status   08/06/2021 >60 >=60 mL/min/1.73 Final     Comment:     Chronic Kidney Disease: less than 60 ml/min/1.73 sq.m. Kidney Failure: less than 15 ml/min/1.73 sq.m. Results valid for patients 18 years and older. GFR    Date Value Ref Range Status   08/06/2021 >60  Final        No results found. Assessment/Plan:      Outpatient Encounter Medications as of 12/14/2022   Medication Sig Dispense Refill    citalopram (CELEXA) 20 MG tablet Take 1 tablet by mouth daily 90 tablet 0    apixaban (ELIQUIS) 5 MG TABS tablet Take 1 tablet by mouth 2 times daily 180 tablet 0    buPROPion (WELLBUTRIN XL) 150 MG extended release tablet Take 1 tablet by mouth every morning 90 tablet 1    omeprazole (PRILOSEC) 40 MG delayed release capsule Take 1 capsule by mouth daily PRN 90 capsule 0    docusate sodium (COLACE) 100 MG capsule Take 100 mg by mouth 2 times daily      Multiple Vitamin (MULTI-VITAMIN DAILY PO) Take by mouth      Psyllium 0.36 g CAPS Take by mouth 2 times daily      [DISCONTINUED] apixaban (ELIQUIS) 5 MG TABS tablet Take 1 tablet by mouth 2 times daily 180 tablet 0    [DISCONTINUED] citalopram (CELEXA) 20 MG tablet Take 1 tablet by mouth daily 90 tablet 0    [DISCONTINUED] apixaban (ELIQUIS) 5 MG TABS tablet Take 1 tablet by mouth 2 times daily 180 tablet 0     No facility-administered encounter medications on file as of 12/14/2022. Stephanie Savage was seen today for 3 month follow-up.     Diagnoses

## 2022-12-14 NOTE — TELEPHONE ENCOUNTER
Already done yesterday
This medication was already filled in the office today.
Speaking Coherently
no

## 2023-02-10 ENCOUNTER — HOSPITAL ENCOUNTER (OUTPATIENT)
Dept: NON INVASIVE DIAGNOSTICS | Age: 58
Discharge: HOME | End: 2023-02-10
Payer: COMMERCIAL

## 2023-02-10 DIAGNOSIS — I35.0 AORTIC STENOSIS, MILD: ICD-10-CM

## 2023-02-10 LAB
LV EF: 58 %
LVEF MODALITY: NORMAL

## 2023-02-10 PROCEDURE — 93306 TTE W/DOPPLER COMPLETE: CPT

## 2023-03-07 DIAGNOSIS — F32.4 MAJOR DEPRESSIVE DISORDER WITH SINGLE EPISODE, IN PARTIAL REMISSION (HCC): ICD-10-CM

## 2023-03-07 RX ORDER — CITALOPRAM 20 MG/1
TABLET ORAL
Qty: 90 TABLET | Refills: 0 | Status: SHIPPED | OUTPATIENT
Start: 2023-03-07

## 2023-03-07 NOTE — TELEPHONE ENCOUNTER
Last Appointment:  12/14/2022  Future Appointments   Date Time Provider Connie Romano   3/15/2023  9:00 AM Анна Flores MD [facility]

## 2023-03-15 ENCOUNTER — OFFICE VISIT (OUTPATIENT)
Dept: PRIMARY CARE CLINIC | Age: 58
End: 2023-03-15
Payer: COMMERCIAL

## 2023-03-15 VITALS
WEIGHT: 228.9 LBS | DIASTOLIC BLOOD PRESSURE: 78 MMHG | OXYGEN SATURATION: 95 % | HEART RATE: 99 BPM | BODY MASS INDEX: 32.77 KG/M2 | SYSTOLIC BLOOD PRESSURE: 128 MMHG | HEIGHT: 70 IN | TEMPERATURE: 97.3 F

## 2023-03-15 DIAGNOSIS — F32.4 MAJOR DEPRESSIVE DISORDER WITH SINGLE EPISODE, IN PARTIAL REMISSION (HCC): ICD-10-CM

## 2023-03-15 DIAGNOSIS — C54.1 ENDOMETRIAL CANCER, FIGO STAGE IVB (HCC): ICD-10-CM

## 2023-03-15 DIAGNOSIS — I26.99 BILATERAL PULMONARY EMBOLISM (HCC): ICD-10-CM

## 2023-03-15 DIAGNOSIS — K21.9 GASTROESOPHAGEAL REFLUX DISEASE WITHOUT ESOPHAGITIS: ICD-10-CM

## 2023-03-15 DIAGNOSIS — I35.0 AORTIC STENOSIS, MILD: Primary | ICD-10-CM

## 2023-03-15 PROCEDURE — G8417 CALC BMI ABV UP PARAM F/U: HCPCS | Performed by: INTERNAL MEDICINE

## 2023-03-15 PROCEDURE — 3074F SYST BP LT 130 MM HG: CPT | Performed by: INTERNAL MEDICINE

## 2023-03-15 PROCEDURE — 3017F COLORECTAL CA SCREEN DOC REV: CPT | Performed by: INTERNAL MEDICINE

## 2023-03-15 PROCEDURE — G8427 DOCREV CUR MEDS BY ELIG CLIN: HCPCS | Performed by: INTERNAL MEDICINE

## 2023-03-15 PROCEDURE — 3078F DIAST BP <80 MM HG: CPT | Performed by: INTERNAL MEDICINE

## 2023-03-15 PROCEDURE — G8482 FLU IMMUNIZE ORDER/ADMIN: HCPCS | Performed by: INTERNAL MEDICINE

## 2023-03-15 PROCEDURE — 1036F TOBACCO NON-USER: CPT | Performed by: INTERNAL MEDICINE

## 2023-03-15 PROCEDURE — 99214 OFFICE O/P EST MOD 30 MIN: CPT | Performed by: INTERNAL MEDICINE

## 2023-03-15 RX ORDER — BUPROPION HYDROCHLORIDE 150 MG/1
150 TABLET ORAL EVERY MORNING
Qty: 90 TABLET | Refills: 0 | Status: SHIPPED | OUTPATIENT
Start: 2023-03-15

## 2023-03-15 RX ORDER — OMEPRAZOLE 40 MG/1
40 CAPSULE, DELAYED RELEASE ORAL DAILY
Qty: 90 CAPSULE | Refills: 0 | Status: SHIPPED | OUTPATIENT
Start: 2023-03-15

## 2023-03-15 SDOH — ECONOMIC STABILITY: HOUSING INSECURITY
IN THE LAST 12 MONTHS, WAS THERE A TIME WHEN YOU DID NOT HAVE A STEADY PLACE TO SLEEP OR SLEPT IN A SHELTER (INCLUDING NOW)?: NO

## 2023-03-15 SDOH — ECONOMIC STABILITY: FOOD INSECURITY: WITHIN THE PAST 12 MONTHS, YOU WORRIED THAT YOUR FOOD WOULD RUN OUT BEFORE YOU GOT MONEY TO BUY MORE.: PATIENT DECLINED

## 2023-03-15 SDOH — ECONOMIC STABILITY: INCOME INSECURITY: HOW HARD IS IT FOR YOU TO PAY FOR THE VERY BASICS LIKE FOOD, HOUSING, MEDICAL CARE, AND HEATING?: PATIENT DECLINED

## 2023-03-15 SDOH — ECONOMIC STABILITY: FOOD INSECURITY: WITHIN THE PAST 12 MONTHS, THE FOOD YOU BOUGHT JUST DIDN'T LAST AND YOU DIDN'T HAVE MONEY TO GET MORE.: PATIENT DECLINED

## 2023-03-15 SDOH — ECONOMIC STABILITY: TRANSPORTATION INSECURITY
IN THE PAST 12 MONTHS, HAS LACK OF TRANSPORTATION KEPT YOU FROM MEETINGS, WORK, OR FROM GETTING THINGS NEEDED FOR DAILY LIVING?: NO

## 2023-03-15 ASSESSMENT — PATIENT HEALTH QUESTIONNAIRE - PHQ9
2. FEELING DOWN, DEPRESSED OR HOPELESS: 0
10. IF YOU CHECKED OFF ANY PROBLEMS, HOW DIFFICULT HAVE THESE PROBLEMS MADE IT FOR YOU TO DO YOUR WORK, TAKE CARE OF THINGS AT HOME, OR GET ALONG WITH OTHER PEOPLE: 0
SUM OF ALL RESPONSES TO PHQ9 QUESTIONS 1 & 2: 2
8. MOVING OR SPEAKING SO SLOWLY THAT OTHER PEOPLE COULD HAVE NOTICED. OR THE OPPOSITE, BEING SO FIGETY OR RESTLESS THAT YOU HAVE BEEN MOVING AROUND A LOT MORE THAN USUAL: 0
3. TROUBLE FALLING OR STAYING ASLEEP: 0
7. TROUBLE CONCENTRATING ON THINGS, SUCH AS READING THE NEWSPAPER OR WATCHING TELEVISION: 0
4. FEELING TIRED OR HAVING LITTLE ENERGY: 0
5. POOR APPETITE OR OVEREATING: 0
SUM OF ALL RESPONSES TO PHQ QUESTIONS 1-9: 2
6. FEELING BAD ABOUT YOURSELF - OR THAT YOU ARE A FAILURE OR HAVE LET YOURSELF OR YOUR FAMILY DOWN: 0
SUM OF ALL RESPONSES TO PHQ QUESTIONS 1-9: 2
SUM OF ALL RESPONSES TO PHQ QUESTIONS 1-9: 2
9. THOUGHTS THAT YOU WOULD BE BETTER OFF DEAD, OR OF HURTING YOURSELF: 0
SUM OF ALL RESPONSES TO PHQ QUESTIONS 1-9: 2
1. LITTLE INTEREST OR PLEASURE IN DOING THINGS: 2

## 2023-03-15 NOTE — PROGRESS NOTES
Chief Complaint   Patient presents with    3 Month Follow-Up     Pt is here as a routine F/U, with labs on file from December for review. CT of the Chest and Abdomen on file from January. HPI:  Patient is here for follow-up of hypertension, uterine cancer and anxiety disorder. She is doing a lot better. She just had CT scan of the chest and abdomen and they appear very stable without any new lymphadenopathy. There are few lymph nodes that are very small and they are stable since her last CT from August.  She is very excited about that. Blood work was also done back in December and appears within normal limits including CBC and BMP. Her blood pressure today is very well controlled off all medications and she continues to decrease the weight with losing about 7 pounds over the course of the last 3-month  She also had a 2D echo to assess for the aortic stenosis and her ejection fraction is between 55 and 60% with an LV diastolic dysfunction and aortic valve root of 4.0 cm². Which is improved from 3 years ago    . Patient complains of left ear itching and irritation and on exam she has mild cerumen impaction and she was advised to use Debrox over-the-counter. Past Medical History, Surgical History, and Family History has been reviewed and updated.     Review of Systems:  Constitutional:  No fever, no fatigue, no chills, no headaches, no weight change  Dermatology:  No rash, no mole, no dry or sensitive skin  ENT:  No cough, no sore throat, no sinus pain, no runny nose, no ear pain  Cardiology:  No chest pain, no palpitations, no leg edema, no shortness of breath, no PND  Gastroenterology:  No dysphagia, no abdominal pain, no nausea, no vomiting, no constipation, no diarrhea, no heartburn  Musculoskeletal:  No joint pain, no leg cramps, no back pain, no muscle aches  Respiratory:  No shortness of breath, no orthopnea, no wheezing, no MAGALLON, no hemoptysis  Urology:  No blood in the urine, no urinary frequency, no urinary incontinence, no urinary urgency, no nocturia, no dysuria    Vitals:    03/15/23 0857   BP: 128/78   Pulse: 99   Temp: 97.3 °F (36.3 °C)   TempSrc: Temporal   SpO2: 95%   Weight: 228 lb 14.4 oz (103.8 kg)   Height: 5' 10\" (1.778 m)       General:  Patient alert and oriented x 3, NAD, pleasant  HEENT:  Atraumatic, normocephalic, PERRLA, EOMI, clear conjunctiva, TMs clear with mild left cerumen impaction, nose-clear, throat - no erythema  Neck:  Supple, no goiter, no carotid bruits, no LAD  Lungs:  CTA   Heart:  RRR, no murmurs, gallops or rubs  Abdomen:  Soft/nt/nd, + bowel sounds  Lymph node examination: unremarkable  Neurological exam : unremarkable  Extremities:  No clubbing, cyanosis or edema  Skin: unremarkable    Cholesterol, Total   Date Value Ref Range Status   02/01/2021 166 mg/dL Final     Triglycerides   Date Value Ref Range Status   02/01/2021 163 mg/dL Final     HDL   Date Value Ref Range Status   02/01/2021 46 35 - 70 mg/dL Final     LDL Calculated   Date Value Ref Range Status   02/01/2021 87 0 - 160 mg/dL Final     VLDL   Date Value Ref Range Status   02/01/2021 33 mg/dL Final     Chol/HDL Ratio   Date Value Ref Range Status   02/01/2021 3.6  Final     Sodium   Date Value Ref Range Status   08/06/2021 138 132 - 146 mmol/L Final     Potassium   Date Value Ref Range Status   08/06/2021 3.8 3.5 - 5.0 mmol/L Final     Chloride   Date Value Ref Range Status   08/06/2021 103 98 - 107 mmol/L Final     CO2   Date Value Ref Range Status   08/06/2021 25 22 - 29 mmol/L Final     BUN   Date Value Ref Range Status   08/06/2021 11 6 - 20 mg/dL Final     Creatinine   Date Value Ref Range Status   08/06/2021 0.6 0.5 - 1.0 mg/dL Final     Glucose   Date Value Ref Range Status   08/06/2021 102 (H) 74 - 99 mg/dL Final     Calcium   Date Value Ref Range Status   08/06/2021 9.2 8.6 - 10.2 mg/dL Final     Total Protein   Date Value Ref Range Status   08/06/2021 7.3 6.4 - 8.3 g/dL Final     Albumin Date Value Ref Range Status   08/06/2021 4.1 3.5 - 5.2 g/dL Final     Total Bilirubin   Date Value Ref Range Status   08/06/2021 <0.2 0.0 - 1.2 mg/dL Final     Alkaline Phosphatase   Date Value Ref Range Status   08/06/2021 70 35 - 104 U/L Final     AST   Date Value Ref Range Status   08/06/2021 31 0 - 31 U/L Final     ALT   Date Value Ref Range Status   08/06/2021 40 (H) 0 - 32 U/L Final     GFR Non-   Date Value Ref Range Status   08/06/2021 >60 >=60 mL/min/1.73 Final     Comment:     Chronic Kidney Disease: less than 60 ml/min/1.73 sq.m. Kidney Failure: less than 15 ml/min/1.73 sq.m. Results valid for patients 18 years and older. GFR    Date Value Ref Range Status   08/06/2021 >60  Final        No results found.      Assessment/Plan:      Outpatient Encounter Medications as of 3/15/2023   Medication Sig Dispense Refill    apixaban (ELIQUIS) 5 MG TABS tablet Take 1 tablet by mouth 2 times daily 180 tablet 0    buPROPion (WELLBUTRIN XL) 150 MG extended release tablet Take 1 tablet by mouth every morning 90 tablet 0    omeprazole (PRILOSEC) 40 MG delayed release capsule Take 1 capsule by mouth daily PRN 90 capsule 0    citalopram (CELEXA) 20 MG tablet take 1 tablet by mouth once daily 90 tablet 0    docusate sodium (COLACE) 100 MG capsule Take 100 mg by mouth 2 times daily      Multiple Vitamin (MULTI-VITAMIN DAILY PO) Take by mouth      Psyllium 0.36 g CAPS Take by mouth 2 times daily      [DISCONTINUED] apixaban (ELIQUIS) 5 MG TABS tablet Take 1 tablet by mouth 2 times daily 180 tablet 0    [DISCONTINUED] buPROPion (WELLBUTRIN XL) 150 MG extended release tablet Take 1 tablet by mouth every morning 90 tablet 1    [DISCONTINUED] omeprazole (PRILOSEC) 40 MG delayed release capsule Take 1 capsule by mouth daily PRN 90 capsule 0    [DISCONTINUED] apixaban (ELIQUIS) 5 MG TABS tablet Take 1 tablet by mouth 2 times daily 180 tablet 0     No facility-administered encounter medications on file as of 3/15/2023. Alon Childress was seen today for 3 month follow-up. Diagnoses and all orders for this visit:    Aortic stenosis, mild    Bilateral pulmonary embolism (HCC)  -     apixaban (ELIQUIS) 5 MG TABS tablet; Take 1 tablet by mouth 2 times daily    Gastroesophageal reflux disease without esophagitis  -     omeprazole (PRILOSEC) 40 MG delayed release capsule; Take 1 capsule by mouth daily PRN    Endometrial cancer, FIGO stage IVB (HCC)    Major depressive disorder with single episode, in partial remission ([facility] [address]    Other orders  -     buPROPion (WELLBUTRIN XL) 150 MG extended release tablet; Take 1 tablet by mouth every morning       Patient Instructions   Debrox in left ear nightly for 5 nights     On this date 3/15/2023 I have spent 30 minutes reviewing previous notes, test results and face to face with the patient discussing the diagnosis and importance of compliance with the treatment plan as well as documenting on the day of the visit.       Marcie Ibrahim MD   3/15/23

## 2023-04-28 ENCOUNTER — HOSPITAL ENCOUNTER (EMERGENCY)
Age: 58
Discharge: HOME | End: 2023-04-28
Payer: COMMERCIAL

## 2023-04-28 VITALS
SYSTOLIC BLOOD PRESSURE: 123 MMHG | TEMPERATURE: 98.6 F | BODY MASS INDEX: 33 KG/M2 | OXYGEN SATURATION: 97 % | DIASTOLIC BLOOD PRESSURE: 95 MMHG | WEIGHT: 230 LBS | RESPIRATION RATE: 18 BRPM | HEART RATE: 99 BPM

## 2023-04-28 DIAGNOSIS — B02.9 HERPES ZOSTER WITHOUT COMPLICATION: Primary | ICD-10-CM

## 2023-04-28 PROCEDURE — 99211 OFF/OP EST MAY X REQ PHY/QHP: CPT

## 2023-04-28 RX ORDER — TRAMADOL HYDROCHLORIDE 50 MG/1
50 TABLET ORAL EVERY 8 HOURS PRN
Qty: 15 TABLET | Refills: 0 | Status: SHIPPED | OUTPATIENT
Start: 2023-04-28 | End: 2023-05-03

## 2023-04-28 RX ORDER — VALACYCLOVIR HYDROCHLORIDE 1 G/1
1000 TABLET, FILM COATED ORAL 3 TIMES DAILY
Qty: 21 TABLET | Refills: 0 | Status: SHIPPED | OUTPATIENT
Start: 2023-04-28 | End: 2023-05-05

## 2023-04-28 ASSESSMENT — PAIN SCALES - GENERAL: PAINLEVEL_OUTOF10: 7

## 2023-04-28 ASSESSMENT — PAIN DESCRIPTION - DESCRIPTORS: DESCRIPTORS: SORE;DISCOMFORT;BURNING

## 2023-04-28 ASSESSMENT — PAIN - FUNCTIONAL ASSESSMENT
PAIN_FUNCTIONAL_ASSESSMENT: 0-10
PAIN_FUNCTIONAL_ASSESSMENT: NONE - DENIES PAIN

## 2023-04-28 ASSESSMENT — PAIN DESCRIPTION - LOCATION: LOCATION: NECK

## 2023-04-28 ASSESSMENT — PAIN DESCRIPTION - PAIN TYPE: TYPE: ACUTE PAIN

## 2023-04-28 ASSESSMENT — PAIN DESCRIPTION - ORIENTATION: ORIENTATION: RIGHT

## 2023-04-28 NOTE — ED PROVIDER NOTES
[facility]  EMERGENCY DEPARTMENT ENCOUNTER        NAME: Nickie Mcguire  :  1965  MRN:  [id number]  Date of evaluation: 2023  Provider: Solitario Lambert PA-C  PCP: Dez Ratliff MD  Note Started : 10:04 AM EDT 23    Chief Complaint: Lymphadenopathy (Swollen glands, rash) and Herpes Zoster (Right side of neck)      This is a 80-year-old female that presents to urgent care and she complains of a painful rash area to the right side of her neck. She noticed some pain couple days ago to the right side of her neck and she noticed a rash yesterday. Patient also states she has been treated for cancer currently. She did show me her last kidney function test it is normal this was a test from 2023. She denies any fevers or chills. No difficulty breathing or swallowing. On first contact patient she appears to be in no acute distress. Review of Systems  Pertinent positives and negatives are stated within HPI, all other systems reviewed and are negative. Allergies: Penicillins     --------------------------------------------- PAST HISTORY ---------------------------------------------  Past Medical History:  has a past medical history of Acid reflux, Cancer ([facility] [address] Depression, Hx of blood clots, Hypertension, and Murmur. Past Surgical History:  has a past surgical history that includes Leg Surgery (Left); cyst removal; Dilation and curettage of uterus (2020); Vein Surgery (Left, 2019); other surgical history (2020); Hysterectomy; Colonoscopy (2019); other surgical history (10/15/2020); and Tunneled venous port placement. Social History:  reports that she quit smoking about 32 years ago. Her smoking use included cigarettes. She started smoking about 39 years ago. She has a 7.00 pack-year smoking history. She has never used smokeless tobacco. She reports that she does not drink alcohol and does not use drugs.     Family History: family history includes

## 2023-06-08 DIAGNOSIS — F32.4 MAJOR DEPRESSIVE DISORDER WITH SINGLE EPISODE, IN PARTIAL REMISSION (HCC): ICD-10-CM

## 2023-06-08 RX ORDER — CITALOPRAM 20 MG/1
TABLET ORAL
Qty: 90 TABLET | Refills: 0 | Status: SHIPPED | OUTPATIENT
Start: 2023-06-08

## 2023-06-09 DIAGNOSIS — K21.9 GASTROESOPHAGEAL REFLUX DISEASE WITHOUT ESOPHAGITIS: ICD-10-CM

## 2023-06-10 RX ORDER — OMEPRAZOLE 40 MG/1
CAPSULE, DELAYED RELEASE ORAL
Qty: 90 CAPSULE | Refills: 0 | Status: SHIPPED | OUTPATIENT
Start: 2023-06-10

## 2023-09-02 DIAGNOSIS — K21.9 GASTROESOPHAGEAL REFLUX DISEASE WITHOUT ESOPHAGITIS: ICD-10-CM

## 2023-09-02 DIAGNOSIS — F32.4 MAJOR DEPRESSIVE DISORDER WITH SINGLE EPISODE, IN PARTIAL REMISSION (HCC): ICD-10-CM

## 2023-09-06 RX ORDER — OMEPRAZOLE 40 MG/1
CAPSULE, DELAYED RELEASE ORAL
Qty: 90 CAPSULE | Refills: 0 | Status: SHIPPED | OUTPATIENT
Start: 2023-09-06

## 2023-09-06 RX ORDER — CITALOPRAM 20 MG/1
TABLET ORAL
Qty: 90 TABLET | Refills: 0 | Status: SHIPPED | OUTPATIENT
Start: 2023-09-06

## 2023-09-10 DIAGNOSIS — I26.99 BILATERAL PULMONARY EMBOLISM (HCC): ICD-10-CM

## 2023-09-11 DIAGNOSIS — I26.99 BILATERAL PULMONARY EMBOLISM (HCC): ICD-10-CM

## 2023-09-11 RX ORDER — APIXABAN 5 MG/1
TABLET, FILM COATED ORAL
Qty: 180 TABLET | Refills: 0 | OUTPATIENT
Start: 2023-09-11

## 2023-09-11 RX ORDER — BUPROPION HYDROCHLORIDE 150 MG/1
150 TABLET ORAL EVERY MORNING
Qty: 90 TABLET | Refills: 0 | Status: SHIPPED | OUTPATIENT
Start: 2023-09-11

## 2023-09-11 NOTE — TELEPHONE ENCOUNTER
----- Message from Dario Day sent at 9/8/2023  3:02 PM EDT -----  Subject: Refill Request    QUESTIONS  Name of Medication? ELIQUIS 5 MG TABS tablet  Patient-reported dosage and instructions? 5 mg 2x a day  How many days do you have left? 14  Preferred Pharmacy? [id number]  Pharmacy phone number (if available)? [phone]  ---------------------------------------------------------------------------  --------------,  Name of Medication? buPROPion (WELLBUTRIN XL) 150 MG extended release   tablet  Patient-reported dosage and instructions? 150 mg 1x a day  How many days do you have left? 14  Preferred Pharmacy? [address] [place] [address] Ave [id number]  Pharmacy phone number (if available)? [phone]  ---------------------------------------------------------------------------  --------------  CALL BACK INFO  What is the best way for the office to contact you? OK to leave message on   voicemail  Preferred Call Back Phone Number? [phone]  ---------------------------------------------------------------------------  --------------  SCRIPT ANSWERS  Relationship to Patient?  Self

## 2023-09-21 LAB — MAMMOGRAPHY, EXTERNAL: NORMAL

## 2023-10-03 LAB
ALBUMIN SERPL-MCNC: NORMAL G/DL
ALP BLD-CCNC: NORMAL U/L
ALT SERPL-CCNC: NORMAL U/L
ANION GAP SERPL CALCULATED.3IONS-SCNC: NORMAL MMOL/L
AST SERPL-CCNC: NORMAL U/L
BILIRUB SERPL-MCNC: NORMAL MG/DL
BUN BLDV-MCNC: NORMAL MG/DL
CALCIUM SERPL-MCNC: NORMAL MG/DL
CHLORIDE BLD-SCNC: NORMAL MMOL/L
CO2: NORMAL
CREAT SERPL-MCNC: NORMAL MG/DL
EGFR: NORMAL
GLUCOSE BLD-MCNC: NORMAL MG/DL
POTASSIUM SERPL-SCNC: NORMAL MMOL/L
SODIUM BLD-SCNC: NORMAL MMOL/L
TOTAL PROTEIN: NORMAL
TSH SERPL DL<=0.05 MIU/L-ACNC: NORMAL M[IU]/L

## 2023-10-04 ENCOUNTER — OFFICE VISIT (OUTPATIENT)
Dept: PRIMARY CARE CLINIC | Age: 58
End: 2023-10-04
Payer: COMMERCIAL

## 2023-10-04 VITALS
SYSTOLIC BLOOD PRESSURE: 118 MMHG | OXYGEN SATURATION: 93 % | BODY MASS INDEX: 33.53 KG/M2 | DIASTOLIC BLOOD PRESSURE: 78 MMHG | HEART RATE: 87 BPM | TEMPERATURE: 97.4 F | WEIGHT: 234.2 LBS | HEIGHT: 70 IN

## 2023-10-04 DIAGNOSIS — F32.5 MAJOR DEPRESSIVE DISORDER WITH SINGLE EPISODE, IN FULL REMISSION (HCC): ICD-10-CM

## 2023-10-04 DIAGNOSIS — I35.0 AORTIC STENOSIS, MILD: Primary | ICD-10-CM

## 2023-10-04 DIAGNOSIS — D64.9 ACUTE ON CHRONIC ANEMIA: ICD-10-CM

## 2023-10-04 DIAGNOSIS — C54.1 ENDOMETRIAL CANCER, FIGO STAGE IVB (HCC): ICD-10-CM

## 2023-10-04 DIAGNOSIS — J98.8 RESPIRATORY TRACT INFECTION: ICD-10-CM

## 2023-10-04 PROCEDURE — 99214 OFFICE O/P EST MOD 30 MIN: CPT | Performed by: INTERNAL MEDICINE

## 2023-10-04 PROCEDURE — G8427 DOCREV CUR MEDS BY ELIG CLIN: HCPCS | Performed by: INTERNAL MEDICINE

## 2023-10-04 PROCEDURE — 3074F SYST BP LT 130 MM HG: CPT | Performed by: INTERNAL MEDICINE

## 2023-10-04 PROCEDURE — 1036F TOBACCO NON-USER: CPT | Performed by: INTERNAL MEDICINE

## 2023-10-04 PROCEDURE — 3078F DIAST BP <80 MM HG: CPT | Performed by: INTERNAL MEDICINE

## 2023-10-04 PROCEDURE — G8417 CALC BMI ABV UP PARAM F/U: HCPCS | Performed by: INTERNAL MEDICINE

## 2023-10-04 PROCEDURE — G8484 FLU IMMUNIZE NO ADMIN: HCPCS | Performed by: INTERNAL MEDICINE

## 2023-10-04 PROCEDURE — 3017F COLORECTAL CA SCREEN DOC REV: CPT | Performed by: INTERNAL MEDICINE

## 2023-10-04 RX ORDER — LORATADINE PSEUDOEPHEDRINE SULFATE 10; 240 MG/1; MG/1
1 TABLET, EXTENDED RELEASE ORAL DAILY
Qty: 30 TABLET | Refills: 1 | Status: SHIPPED | OUTPATIENT
Start: 2023-10-04

## 2023-10-04 NOTE — PROGRESS NOTES
Chief Complaint   Patient presents with    6 Month Follow-Up     Pt is a routine F/U with labs on file from yesterday for review. Pt states she was sick 3 weeks with neg. Covid x2 and had multiple symptoms, N/V, chills, fever loss of taste and smell and was seen at [facility] yesterday and is feeling better after receiving fluids. HPI:  Patient is here for follow-up of uterine cancer depression and chronic sinusitis. Patient is doing well, compliant with medications. She has been sick over the course of the last 2 weeks with what appeared to be nausea vomiting and diarrhea and some upper respiratory tract congestion with loss of taste or smell. She does not have positive COVID but was negative. She has been receiving Keytruda and been followed by oncology. Check she feels a lot better since she was hydrated yesterday with oncology with IV fluids. Her symptoms have completely resolved. Blood work was discussed with patient appears within reasonable range except for mild acute on top of chronic anemia. Hemoglobin count of 10.7 compared to last month 11.7. Patient is compensating due to the recent viral infection she had and she is following blood work again in 3 weeks with oncology we will keep an eye on hemoglobin count. She denies any bleeding hematochezia melena or any gastroesophageal flux disease at this point. .  Patient complains of mild sinus drainage that she feels in the history of pharyngeal wall and will start on Claritin-D daily. .    Past Medical History, Surgical History, and Family History has been reviewed and updated.     Review of Systems:  Constitutional:  No fever, no fatigue, no chills, no headaches, no weight change  Dermatology:  No rash, no mole, no dry or sensitive skin  ENT:  No cough, no sore throat, no sinus pain, no runny nose, no ear pain  Cardiology:  No chest pain, no palpitations, no leg edema, no shortness of breath, no PND  Gastroenterology:  No dysphagia, no

## 2023-10-24 LAB
BASOPHILS ABSOLUTE: NORMAL
BASOPHILS RELATIVE PERCENT: NORMAL
EOSINOPHILS ABSOLUTE: NORMAL
EOSINOPHILS RELATIVE PERCENT: NORMAL
HCT VFR BLD CALC: NORMAL %
HEMOGLOBIN: NORMAL
IRON: NORMAL
LYMPHOCYTES ABSOLUTE: NORMAL
LYMPHOCYTES RELATIVE PERCENT: NORMAL
MCH RBC QN AUTO: NORMAL PG
MCHC RBC AUTO-ENTMCNC: NORMAL G/DL
MCV RBC AUTO: NORMAL FL
MONOCYTES ABSOLUTE: NORMAL
MONOCYTES RELATIVE PERCENT: NORMAL
NEUTROPHILS ABSOLUTE: NORMAL
NEUTROPHILS RELATIVE PERCENT: NORMAL
PLATELET # BLD: NORMAL 10*3/UL
PMV BLD AUTO: NORMAL FL
RBC # BLD: NORMAL 10*6/UL
TOTAL IRON BINDING CAPACITY: NORMAL
TSH SERPL DL<=0.05 MIU/L-ACNC: NORMAL M[IU]/L
WBC # BLD: NORMAL 10*3/UL

## 2023-11-27 DIAGNOSIS — F32.4 MAJOR DEPRESSIVE DISORDER WITH SINGLE EPISODE, IN PARTIAL REMISSION (HCC): ICD-10-CM

## 2023-11-27 DIAGNOSIS — K21.9 GASTROESOPHAGEAL REFLUX DISEASE WITHOUT ESOPHAGITIS: ICD-10-CM

## 2023-11-27 RX ORDER — OMEPRAZOLE 40 MG/1
CAPSULE, DELAYED RELEASE ORAL
Qty: 90 CAPSULE | Refills: 0 | Status: SHIPPED | OUTPATIENT
Start: 2023-11-27

## 2023-11-27 RX ORDER — CITALOPRAM 20 MG/1
TABLET ORAL
Qty: 90 TABLET | Refills: 0 | Status: SHIPPED | OUTPATIENT
Start: 2023-11-27

## 2023-12-04 ENCOUNTER — APPOINTMENT (OUTPATIENT)
Dept: CT IMAGING | Age: 58
End: 2023-12-04
Payer: COMMERCIAL

## 2023-12-04 ENCOUNTER — OFFICE VISIT (OUTPATIENT)
Dept: FAMILY MEDICINE CLINIC | Age: 58
End: 2023-12-04
Payer: COMMERCIAL

## 2023-12-04 ENCOUNTER — APPOINTMENT (OUTPATIENT)
Dept: ULTRASOUND IMAGING | Age: 58
End: 2023-12-04
Payer: COMMERCIAL

## 2023-12-04 ENCOUNTER — APPOINTMENT (OUTPATIENT)
Dept: GENERAL RADIOLOGY | Age: 58
End: 2023-12-04
Payer: COMMERCIAL

## 2023-12-04 ENCOUNTER — HOSPITAL ENCOUNTER (EMERGENCY)
Age: 58
Discharge: HOME | End: 2023-12-04
Attending: EMERGENCY MEDICINE
Payer: COMMERCIAL

## 2023-12-04 VITALS
OXYGEN SATURATION: 96 % | HEART RATE: 95 BPM | RESPIRATION RATE: 18 BRPM | TEMPERATURE: 97.3 F | SYSTOLIC BLOOD PRESSURE: 100 MMHG | DIASTOLIC BLOOD PRESSURE: 60 MMHG

## 2023-12-04 VITALS
WEIGHT: 234 LBS | DIASTOLIC BLOOD PRESSURE: 75 MMHG | OXYGEN SATURATION: 98 % | HEART RATE: 128 BPM | TEMPERATURE: 97.6 F | BODY MASS INDEX: 33.5 KG/M2 | SYSTOLIC BLOOD PRESSURE: 124 MMHG | RESPIRATION RATE: 18 BRPM | HEIGHT: 70 IN

## 2023-12-04 DIAGNOSIS — M10.9 ACUTE GOUT OF RIGHT KNEE, UNSPECIFIED CAUSE: ICD-10-CM

## 2023-12-04 DIAGNOSIS — M79.89 LEG SWELLING: ICD-10-CM

## 2023-12-04 DIAGNOSIS — R52 PAIN: Primary | ICD-10-CM

## 2023-12-04 DIAGNOSIS — M79.604 RIGHT LEG PAIN: Primary | ICD-10-CM

## 2023-12-04 LAB
ANION GAP SERPL CALCULATED.3IONS-SCNC: 12 MMOL/L (ref 7–16)
BUN SERPL-MCNC: 19 MG/DL (ref 6–20)
CALCIUM SERPL-MCNC: 10.1 MG/DL (ref 8.6–10.2)
CHLORIDE SERPL-SCNC: 94 MMOL/L (ref 98–107)
CO2 SERPL-SCNC: 23 MMOL/L (ref 22–29)
CREAT SERPL-MCNC: 0.7 MG/DL (ref 0.5–1)
ERYTHROCYTE [DISTWIDTH] IN BLOOD BY AUTOMATED COUNT: 13.3 % (ref 11.5–15)
GFR SERPL CREATININE-BSD FRML MDRD: >60 ML/MIN/1.73M2
GLUCOSE SERPL-MCNC: 113 MG/DL (ref 74–99)
HCT VFR BLD AUTO: 39.7 % (ref 34–48)
HGB BLD-MCNC: 13 G/DL (ref 11.5–15.5)
MCH RBC QN AUTO: 28.1 PG (ref 26–35)
MCHC RBC AUTO-ENTMCNC: 32.7 G/DL (ref 32–34.5)
MCV RBC AUTO: 85.9 FL (ref 80–99.9)
PLATELET # BLD AUTO: 491 K/UL (ref 130–450)
PMV BLD AUTO: 8.6 FL (ref 7–12)
POTASSIUM SERPL-SCNC: 3.9 MMOL/L (ref 3.5–5)
RBC # BLD AUTO: 4.62 M/UL (ref 3.5–5.5)
SARS-COV-2 RDRP RESP QL NAA+PROBE: NOT DETECTED
SODIUM SERPL-SCNC: 129 MMOL/L (ref 132–146)
SPECIMEN DESCRIPTION: NORMAL
TROPONIN I SERPL HS-MCNC: 6 NG/L (ref 0–9)
URATE SERPL-MCNC: 7 MG/DL (ref 2.4–5.7)
WBC OTHER # BLD: 11.6 K/UL (ref 4.5–11.5)

## 2023-12-04 PROCEDURE — 84484 ASSAY OF TROPONIN QUANT: CPT

## 2023-12-04 PROCEDURE — G8484 FLU IMMUNIZE NO ADMIN: HCPCS

## 2023-12-04 PROCEDURE — 93005 ELECTROCARDIOGRAM TRACING: CPT | Performed by: EMERGENCY MEDICINE

## 2023-12-04 PROCEDURE — 6360000004 HC RX CONTRAST MEDICATION: Performed by: RADIOLOGY

## 2023-12-04 PROCEDURE — G8427 DOCREV CUR MEDS BY ELIG CLIN: HCPCS

## 2023-12-04 PROCEDURE — 1036F TOBACCO NON-USER: CPT

## 2023-12-04 PROCEDURE — 3078F DIAST BP <80 MM HG: CPT

## 2023-12-04 PROCEDURE — 84550 ASSAY OF BLOOD/URIC ACID: CPT

## 2023-12-04 PROCEDURE — 3074F SYST BP LT 130 MM HG: CPT

## 2023-12-04 PROCEDURE — 96361 HYDRATE IV INFUSION ADD-ON: CPT

## 2023-12-04 PROCEDURE — 71275 CT ANGIOGRAPHY CHEST: CPT

## 2023-12-04 PROCEDURE — 87635 SARS-COV-2 COVID-19 AMP PRB: CPT

## 2023-12-04 PROCEDURE — 99285 EMERGENCY DEPT VISIT HI MDM: CPT

## 2023-12-04 PROCEDURE — 3017F COLORECTAL CA SCREEN DOC REV: CPT

## 2023-12-04 PROCEDURE — 99213 OFFICE O/P EST LOW 20 MIN: CPT

## 2023-12-04 PROCEDURE — 73560 X-RAY EXAM OF KNEE 1 OR 2: CPT

## 2023-12-04 PROCEDURE — 85027 COMPLETE CBC AUTOMATED: CPT

## 2023-12-04 PROCEDURE — 36415 COLL VENOUS BLD VENIPUNCTURE: CPT

## 2023-12-04 PROCEDURE — 96360 HYDRATION IV INFUSION INIT: CPT

## 2023-12-04 PROCEDURE — 80048 BASIC METABOLIC PNL TOTAL CA: CPT

## 2023-12-04 PROCEDURE — G8417 CALC BMI ABV UP PARAM F/U: HCPCS

## 2023-12-04 PROCEDURE — 93971 EXTREMITY STUDY: CPT

## 2023-12-04 PROCEDURE — 2580000003 HC RX 258: Performed by: EMERGENCY MEDICINE

## 2023-12-04 RX ORDER — PREDNISONE 20 MG/1
40 TABLET ORAL DAILY
Qty: 10 TABLET | Refills: 0 | Status: SHIPPED | OUTPATIENT
Start: 2023-12-04 | End: 2023-12-09

## 2023-12-04 RX ORDER — 0.9 % SODIUM CHLORIDE 0.9 %
1000 INTRAVENOUS SOLUTION INTRAVENOUS ONCE
Status: COMPLETED | OUTPATIENT
Start: 2023-12-04 | End: 2023-12-04

## 2023-12-04 RX ADMIN — SODIUM CHLORIDE 1000 ML: 9 INJECTION, SOLUTION INTRAVENOUS at 15:36

## 2023-12-04 RX ADMIN — IOPAMIDOL 75 ML: 755 INJECTION, SOLUTION INTRAVENOUS at 17:11

## 2023-12-04 ASSESSMENT — ENCOUNTER SYMPTOMS
COUGH: 1
SHORTNESS OF BREATH: 1
WHEEZING: 0
CHEST TIGHTNESS: 1
CONSTIPATION: 0
VOMITING: 0
DIARRHEA: 0
NAUSEA: 0
ABDOMINAL PAIN: 0

## 2023-12-04 NOTE — ED PROVIDER NOTES
rebound. Musculoskeletal:         General: Swelling and tenderness present. No deformity. Normal range of motion. Cervical back: Normal range of motion and neck supple. Right lower leg: No edema. Left lower leg: No edema. Skin:     General: Skin is warm and dry. Capillary Refill: Capillary refill takes less than 2 seconds. Coloration: Skin is not pale. Findings: No bruising, erythema or lesion. Neurological:      General: No focal deficit present. Mental Status: She is alert and oriented to person, place, and time. Mental status is at baseline. Cranial Nerves: No cranial nerve deficit. Coordination: Coordination normal.   Psychiatric:         Mood and Affect: Mood normal.         Behavior: Behavior normal.         Thought Content: Thought content normal.         Judgment: Judgment normal.         Procedures    Medical Decision Making  Amount and/or Complexity of Data Reviewed  Labs: ordered. Radiology: ordered. ECG/medicine tests: ordered. Risk  Prescription drug management. Patient presents with complaint of lower leg edema. She was found to be tachycardic. History of PE and DVT. Will CTA chest and US lower extremity to rule out clots. ACS work up and r/o of infection also planned. Heart rate came down. PE and DVT ruled out. Patient had elevated uric acid and a joint effusion on xray. Will treat with prednisone and tylenol as she is on Eliquis. She will keep an ace wrap on it and follow up with ortho and PCP. We discussed reasons to return to the ED. ED Course as of 12/04/23 1924   Mon Dec 04, 2023   1744 Patient pulse is down to 95 bpm.  Her blood pressure is up to 648 systolic. Doing well. Awaiting results on CTA.  [JS]      ED Course User Index  [JS] Carlos De La Rosa DO     EKG Interpretation    Interpreted by emergency department physician    Rhythm: sinus tachycardia  Rate: 130-140  Axis: normal  Ectopy: none  Conduction:

## 2023-12-05 LAB
ALBUMIN SERPL-MCNC: NORMAL G/DL
ALP BLD-CCNC: NORMAL U/L
ALT SERPL-CCNC: NORMAL U/L
ANION GAP SERPL CALCULATED.3IONS-SCNC: NORMAL MMOL/L
AST SERPL-CCNC: NORMAL U/L
BILIRUB SERPL-MCNC: NORMAL MG/DL
BUN BLDV-MCNC: NORMAL MG/DL
CALCIUM SERPL-MCNC: NORMAL MG/DL
CHLORIDE BLD-SCNC: NORMAL MMOL/L
CO2: NORMAL
CREAT SERPL-MCNC: NORMAL MG/DL
EGFR: NORMAL
EKG ATRIAL RATE: 136 BPM
EKG P AXIS: 58 DEGREES
EKG P-R INTERVAL: 144 MS
EKG Q-T INTERVAL: 378 MS
EKG QRS DURATION: 84 MS
EKG QTC CALCULATION (BAZETT): 567 MS
EKG R AXIS: 47 DEGREES
EKG T AXIS: 66 DEGREES
EKG VENTRICULAR RATE: 135 BPM
GLUCOSE BLD-MCNC: NORMAL MG/DL
POTASSIUM SERPL-SCNC: NORMAL MMOL/L
SODIUM BLD-SCNC: NORMAL MMOL/L
TOTAL PROTEIN: NORMAL

## 2023-12-05 PROCEDURE — 93010 ELECTROCARDIOGRAM REPORT: CPT | Performed by: INTERNAL MEDICINE

## 2023-12-05 NOTE — ED NOTES
Pt's PIV removed with catheter intact. Pt given d/c instructions and was able to verbalize understanding.       Annette Carreon RN  12/04/23 1928

## 2023-12-12 DIAGNOSIS — I26.99 BILATERAL PULMONARY EMBOLISM (HCC): ICD-10-CM

## 2023-12-12 RX ORDER — APIXABAN 5 MG/1
5 TABLET, FILM COATED ORAL 2 TIMES DAILY
Qty: 180 TABLET | Refills: 0 | Status: SHIPPED | OUTPATIENT
Start: 2023-12-12

## 2023-12-13 ENCOUNTER — OFFICE VISIT (OUTPATIENT)
Dept: PRIMARY CARE CLINIC | Age: 58
End: 2023-12-13
Payer: COMMERCIAL

## 2023-12-13 VITALS
TEMPERATURE: 97.4 F | SYSTOLIC BLOOD PRESSURE: 118 MMHG | WEIGHT: 223.8 LBS | HEIGHT: 70 IN | HEART RATE: 110 BPM | OXYGEN SATURATION: 93 % | BODY MASS INDEX: 32.04 KG/M2 | DIASTOLIC BLOOD PRESSURE: 80 MMHG

## 2023-12-13 DIAGNOSIS — E87.1 HYPONATREMIA: ICD-10-CM

## 2023-12-13 DIAGNOSIS — M10.00 ACUTE IDIOPATHIC GOUT, UNSPECIFIED SITE: ICD-10-CM

## 2023-12-13 DIAGNOSIS — J01.11 ACUTE RECURRENT FRONTAL SINUSITIS: ICD-10-CM

## 2023-12-13 DIAGNOSIS — M13.0 POLYARTHRITIS: ICD-10-CM

## 2023-12-13 DIAGNOSIS — M13.0 POLYARTHRITIS: Primary | ICD-10-CM

## 2023-12-13 LAB
ANION GAP SERPL CALCULATED.3IONS-SCNC: 16 MMOL/L (ref 7–16)
BUN BLDV-MCNC: 14 MG/DL (ref 6–20)
C-REACTIVE PROTEIN: 65 MG/L (ref 0–5)
CALCIUM SERPL-MCNC: 9.5 MG/DL (ref 8.6–10.2)
CHLORIDE BLD-SCNC: 94 MMOL/L (ref 98–107)
CO2: 22 MMOL/L (ref 22–29)
CREAT SERPL-MCNC: 0.6 MG/DL (ref 0.5–1)
GFR SERPL CREATININE-BSD FRML MDRD: >60 ML/MIN/1.73M2
GLUCOSE BLD-MCNC: 99 MG/DL (ref 74–99)
POTASSIUM SERPL-SCNC: 4.3 MMOL/L (ref 3.5–5)
RHEUMATOID FACTOR: <10 IU/ML (ref 0–13)
SODIUM BLD-SCNC: 132 MMOL/L (ref 132–146)

## 2023-12-13 PROCEDURE — 3017F COLORECTAL CA SCREEN DOC REV: CPT | Performed by: INTERNAL MEDICINE

## 2023-12-13 PROCEDURE — G8417 CALC BMI ABV UP PARAM F/U: HCPCS | Performed by: INTERNAL MEDICINE

## 2023-12-13 PROCEDURE — G8427 DOCREV CUR MEDS BY ELIG CLIN: HCPCS | Performed by: INTERNAL MEDICINE

## 2023-12-13 PROCEDURE — 3079F DIAST BP 80-89 MM HG: CPT | Performed by: INTERNAL MEDICINE

## 2023-12-13 PROCEDURE — 3074F SYST BP LT 130 MM HG: CPT | Performed by: INTERNAL MEDICINE

## 2023-12-13 PROCEDURE — 99214 OFFICE O/P EST MOD 30 MIN: CPT | Performed by: INTERNAL MEDICINE

## 2023-12-13 PROCEDURE — 1036F TOBACCO NON-USER: CPT | Performed by: INTERNAL MEDICINE

## 2023-12-13 PROCEDURE — G8484 FLU IMMUNIZE NO ADMIN: HCPCS | Performed by: INTERNAL MEDICINE

## 2023-12-13 RX ORDER — COLCHICINE 0.6 MG/1
0.6 TABLET ORAL 2 TIMES DAILY
Qty: 30 TABLET | Refills: 3 | Status: SHIPPED | OUTPATIENT
Start: 2023-12-13

## 2023-12-13 RX ORDER — LORATADINE PSEUDOEPHEDRINE SULFATE 10; 240 MG/1; MG/1
1 TABLET, EXTENDED RELEASE ORAL DAILY
Qty: 30 TABLET | Refills: 1 | Status: SHIPPED | OUTPATIENT
Start: 2023-12-13

## 2023-12-13 RX ORDER — AZITHROMYCIN 250 MG/1
250 TABLET, FILM COATED ORAL SEE ADMIN INSTRUCTIONS
Qty: 6 TABLET | Refills: 0 | Status: SHIPPED | OUTPATIENT
Start: 2023-12-13 | End: 2023-12-18

## 2023-12-14 LAB — SEDIMENTATION RATE, ERYTHROCYTE: 44 MM/HR (ref 0–20)

## 2023-12-15 LAB — ANTI-NUCLEAR ANTIBODY (ANA): NEGATIVE

## 2024-01-12 ENCOUNTER — OFFICE VISIT (OUTPATIENT)
Dept: PRIMARY CARE CLINIC | Age: 59
End: 2024-01-12
Payer: COMMERCIAL

## 2024-01-12 VITALS
OXYGEN SATURATION: 97 % | DIASTOLIC BLOOD PRESSURE: 78 MMHG | HEART RATE: 102 BPM | BODY MASS INDEX: 30.99 KG/M2 | HEIGHT: 70 IN | WEIGHT: 216.5 LBS | SYSTOLIC BLOOD PRESSURE: 118 MMHG | TEMPERATURE: 97.1 F

## 2024-01-12 DIAGNOSIS — F32.0 CURRENT MILD EPISODE OF MAJOR DEPRESSIVE DISORDER WITHOUT PRIOR EPISODE (HCC): ICD-10-CM

## 2024-01-12 DIAGNOSIS — R35.89 POLYURIA: ICD-10-CM

## 2024-01-12 DIAGNOSIS — C54.1 ENDOMETRIAL CANCER, FIGO STAGE IVB (HCC): ICD-10-CM

## 2024-01-12 DIAGNOSIS — M25.462 BILATERAL KNEE EFFUSIONS: ICD-10-CM

## 2024-01-12 DIAGNOSIS — M25.461 BILATERAL KNEE EFFUSIONS: ICD-10-CM

## 2024-01-12 DIAGNOSIS — M10.9 GOUTY ARTHRITIS OF BOTH KNEES: Primary | ICD-10-CM

## 2024-01-12 DIAGNOSIS — N30.00 ACUTE CYSTITIS WITHOUT HEMATURIA: ICD-10-CM

## 2024-01-12 LAB
BILIRUBIN, POC: NORMAL
BLOOD URINE, POC: NORMAL
CLARITY, POC: CLEAR
COLOR, POC: NORMAL
GLUCOSE URINE, POC: NORMAL
KETONES, POC: NORMAL
LEUKOCYTE EST, POC: NORMAL
NITRITE, POC: NORMAL
PH, POC: 7
PROTEIN, POC: NORMAL
SPECIFIC GRAVITY, POC: 1.01
UROBILINOGEN, POC: NORMAL

## 2024-01-12 PROCEDURE — 3078F DIAST BP <80 MM HG: CPT | Performed by: INTERNAL MEDICINE

## 2024-01-12 PROCEDURE — 3074F SYST BP LT 130 MM HG: CPT | Performed by: INTERNAL MEDICINE

## 2024-01-12 PROCEDURE — 81002 URINALYSIS NONAUTO W/O SCOPE: CPT | Performed by: INTERNAL MEDICINE

## 2024-01-12 PROCEDURE — 99214 OFFICE O/P EST MOD 30 MIN: CPT | Performed by: INTERNAL MEDICINE

## 2024-01-12 RX ORDER — CIPROFLOXACIN 500 MG/1
500 TABLET, FILM COATED ORAL 2 TIMES DAILY
Qty: 14 TABLET | Refills: 0 | Status: SHIPPED | OUTPATIENT
Start: 2024-01-12 | End: 2024-01-19

## 2024-01-12 RX ORDER — PREDNISONE 10 MG/1
10 TABLET ORAL DAILY
Qty: 30 TABLET | Refills: 0 | Status: SHIPPED | OUTPATIENT
Start: 2024-01-12 | End: 2024-02-11

## 2024-01-12 RX ORDER — COLCHICINE 0.6 MG/1
0.6 TABLET ORAL 3 TIMES DAILY
Qty: 60 TABLET | Refills: 1 | Status: SHIPPED | OUTPATIENT
Start: 2024-01-12

## 2024-01-12 ASSESSMENT — PATIENT HEALTH QUESTIONNAIRE - PHQ9
5. POOR APPETITE OR OVEREATING: 0
8. MOVING OR SPEAKING SO SLOWLY THAT OTHER PEOPLE COULD HAVE NOTICED. OR THE OPPOSITE, BEING SO FIGETY OR RESTLESS THAT YOU HAVE BEEN MOVING AROUND A LOT MORE THAN USUAL: 0
SUM OF ALL RESPONSES TO PHQ QUESTIONS 1-9: 0
7. TROUBLE CONCENTRATING ON THINGS, SUCH AS READING THE NEWSPAPER OR WATCHING TELEVISION: 0
SUM OF ALL RESPONSES TO PHQ QUESTIONS 1-9: 0
2. FEELING DOWN, DEPRESSED OR HOPELESS: 0
SUM OF ALL RESPONSES TO PHQ QUESTIONS 1-9: 0
4. FEELING TIRED OR HAVING LITTLE ENERGY: 0
3. TROUBLE FALLING OR STAYING ASLEEP: 0
SUM OF ALL RESPONSES TO PHQ QUESTIONS 1-9: 0
9. THOUGHTS THAT YOU WOULD BE BETTER OFF DEAD, OR OF HURTING YOURSELF: 0
1. LITTLE INTEREST OR PLEASURE IN DOING THINGS: 0
10. IF YOU CHECKED OFF ANY PROBLEMS, HOW DIFFICULT HAVE THESE PROBLEMS MADE IT FOR YOU TO DO YOUR WORK, TAKE CARE OF THINGS AT HOME, OR GET ALONG WITH OTHER PEOPLE: 0
6. FEELING BAD ABOUT YOURSELF - OR THAT YOU ARE A FAILURE OR HAVE LET YOURSELF OR YOUR FAMILY DOWN: 0
SUM OF ALL RESPONSES TO PHQ9 QUESTIONS 1 & 2: 0

## 2024-01-12 NOTE — PROGRESS NOTES
loratadine-pseudoephedrine (CLARITIN-D 24 HOUR)  MG per extended release tablet Take 1 tablet by mouth daily 30 tablet 1    ELIQUIS 5 MG TABS tablet take 1 tablet by mouth twice a day 180 tablet 0    omeprazole (PRILOSEC) 40 MG delayed release capsule take 1 capsule by mouth daily if needed 90 capsule 0    citalopram (CELEXA) 20 MG tablet take 1 tablet by mouth once daily 90 tablet 0    Pembrolizumab (KEYTRUDA IV) Infuse intravenously      docusate sodium (COLACE) 100 MG capsule Take 1 capsule by mouth 2 times daily      Multiple Vitamin (MULTI-VITAMIN DAILY PO) Take by mouth      Psyllium 0.36 g CAPS Take by mouth 2 times daily      buPROPion (WELLBUTRIN XL) 150 MG extended release tablet take 1 tablet by mouth every morning (Patient not taking: Reported on 1/12/2024) 90 tablet 0    [DISCONTINUED] colchicine (COLCRYS) 0.6 MG tablet Take 1 tablet by mouth 2 times daily 30 tablet 3     No facility-administered encounter medications on file as of 1/12/2024.        Lily was seen today for 1 month follow-up.    Diagnoses and all orders for this visit:    Gouty arthritis of both knees  -     Robyn Carrera DO, [facility]  -     predniSONE (DELTASONE) 10 MG tablet; Take 1 tablet by mouth daily  -     colchicine (COLCRYS) 0.6 MG tablet; Take 1 tablet by mouth 3 times daily  -     XR KNEE LEFT (3 VIEWS); Future    Polyuria  -     POCT Urinalysis no Micro    Bilateral knee effusions  -     Robyn Carrera DO, Physical Medicine and Rehabilitation, [facility]  -     XR KNEE LEFT (3 VIEWS); Future    Endometrial cancer, FIGO stage IVB (HCC)    Current mild episode of major depressive disorder without prior episode (HCC)    Acute cystitis without hematuria    Other orders  -     ciprofloxacin (CIPRO) 500 MG tablet; Take 1 tablet by mouth 2 times daily for 7 days         There are no Patient Instructions on file for this visit.           Ting Adame MD   1/12/24

## 2024-01-16 LAB
ALBUMIN SERPL-MCNC: NORMAL G/DL
ALP BLD-CCNC: NORMAL U/L
ALT SERPL-CCNC: NORMAL U/L
ANION GAP SERPL CALCULATED.3IONS-SCNC: NORMAL MMOL/L
AST SERPL-CCNC: NORMAL U/L
BILIRUB SERPL-MCNC: NORMAL MG/DL
BUN BLDV-MCNC: NORMAL MG/DL
CALCIUM SERPL-MCNC: NORMAL MG/DL
CHLORIDE BLD-SCNC: NORMAL MMOL/L
CO2: NORMAL
CREAT SERPL-MCNC: NORMAL MG/DL
EGFR: NORMAL
GLUCOSE BLD-MCNC: NORMAL MG/DL
POTASSIUM SERPL-SCNC: NORMAL MMOL/L
SODIUM BLD-SCNC: NORMAL MMOL/L
TOTAL PROTEIN: NORMAL

## 2024-01-19 ENCOUNTER — TELEPHONE (OUTPATIENT)
Dept: FAMILY MEDICINE CLINIC | Age: 59
End: 2024-01-19

## 2024-01-19 DIAGNOSIS — M10.9 GOUTY ARTHRITIS OF BOTH KNEES: Primary | ICD-10-CM

## 2024-01-19 NOTE — TELEPHONE ENCOUNTER
Dx: Rex. Knee effusion with acute gout, for arthroscentesis with fluid analyis     Referral sent for Dr Mishra for      - Gouty arthritis of both knees   M25.461, M25.462 (ICD-10-CM) - Bilateral knee effusions             Dr Mishra does not treat and eval for the above diagnosis per clinic. Please advise patient    [phone]

## 2024-02-05 DIAGNOSIS — M10.9 GOUTY ARTHRITIS OF BOTH KNEES: ICD-10-CM

## 2024-02-06 RX ORDER — PREDNISONE 10 MG/1
10 TABLET ORAL DAILY
Qty: 30 TABLET | Refills: 0 | Status: SHIPPED | OUTPATIENT
Start: 2024-02-06 | End: 2024-03-07

## 2024-02-08 RX ORDER — LORATADINE/PSEUDOEPHEDRINE 10MG-240MG
1 TABLET, EXTENDED RELEASE 24 HR ORAL DAILY
Qty: 30 TABLET | Refills: 1 | Status: SHIPPED | OUTPATIENT
Start: 2024-02-08

## 2024-02-20 ENCOUNTER — OFFICE VISIT (OUTPATIENT)
Dept: ORTHOPEDIC SURGERY | Age: 59
End: 2024-02-20
Payer: COMMERCIAL

## 2024-02-20 VITALS — BODY MASS INDEX: 30.92 KG/M2 | WEIGHT: 216 LBS | HEIGHT: 70 IN | TEMPERATURE: 98 F

## 2024-02-20 DIAGNOSIS — M17.11 PRIMARY OSTEOARTHRITIS OF RIGHT KNEE: Primary | ICD-10-CM

## 2024-02-20 DIAGNOSIS — M17.12 PRIMARY OSTEOARTHRITIS OF LEFT KNEE: ICD-10-CM

## 2024-02-20 PROCEDURE — 20610 DRAIN/INJ JOINT/BURSA W/O US: CPT | Performed by: ORTHOPAEDIC SURGERY

## 2024-02-20 PROCEDURE — 99203 OFFICE O/P NEW LOW 30 MIN: CPT | Performed by: ORTHOPAEDIC SURGERY

## 2024-02-20 RX ORDER — TRIAMCINOLONE ACETONIDE 40 MG/ML
40 INJECTION, SUSPENSION INTRA-ARTICULAR; INTRAMUSCULAR ONCE
Status: COMPLETED | OUTPATIENT
Start: 2024-02-20 | End: 2024-02-20

## 2024-02-20 RX ADMIN — TRIAMCINOLONE ACETONIDE 40 MG: 40 INJECTION, SUSPENSION INTRA-ARTICULAR; INTRAMUSCULAR at 09:06

## 2024-02-20 SDOH — HEALTH STABILITY: PHYSICAL HEALTH: ON AVERAGE, HOW MANY DAYS PER WEEK DO YOU ENGAGE IN MODERATE TO STRENUOUS EXERCISE (LIKE A BRISK WALK)?: 0 DAYS

## 2024-02-20 NOTE — PROGRESS NOTES
stool, (-) gastric ulcer.  Psychiatric: (-) Depression, (-) Anxiety, (-) bipolar disease, (-) Alzheimer's Disease  Allergic/Immunologic: (-) allergies latex, (-) allergies metal, (-) skin sensitivity.  Hematlogic: (-) anemia, (-) blood transfusion, (-) DVT/PE, (-) Clotting disorders    Subjective:    Constitution:  Temp 98 °F (36.7 °C)   Ht 1.778 m (5' 10\")   Wt 98 kg (216 lb)   BMI 30.99 kg/m²     Psycihatric:  The patient is alert and oriented x 3, appears to be stated age and in no distress.      Respiratory:  Respiratory effort is not labored.  Patient is not gasping.  Palpation of the chest reveals no tactile fremitus.    Skin:  Upon inspection: the skin appears warm, dry and intact.  There is  a previous scar over the affected area.There is any cellulitis, lymphedema or cutaneous lesions noted in the lower extremities.   Upon palpation there is no induration noted.      Neurologic:  Gait: antalgic;  Motor exam of the lower extremities show ; quadriceps, hamstrings, foot dorsi and plantar flexors intact R.  5/5 and L. 5/5. Deep tendon reflexes are 2/4 at the knees and 2/4 at the ankles with strong extensor hallicus longus motor strength bilaterally. Sensory to both feet is intact to all sensory roots.    Cardiovascular:  The vascular exam is normal and is well perfused to distal extremities.  Distal pulses DP/PT: R. 2+; L. 2+.  There is cap refill noted less than two seconds in all digits. There is not edema of the bilateral lower extremities.  There is not varicosities noted in the distal extremities.      Lymph:  Upon palpation,  there is no lymphadenopathy noted in bilateral lower extremities.      Musculoskeletal:  Gait: antalgic; examination of the nails and digits reveal no cyanosis or clubbing.    Lumbar exam:  On visual inspection, there is not deformity of the spine.   full range of motion, no tenderness, palpable spasm or pain on motion. Special tests: Straight Leg Raise negative, Dara test

## 2024-02-21 DIAGNOSIS — F32.4 MAJOR DEPRESSIVE DISORDER WITH SINGLE EPISODE, IN PARTIAL REMISSION (HCC): ICD-10-CM

## 2024-02-21 DIAGNOSIS — K21.9 GASTROESOPHAGEAL REFLUX DISEASE WITHOUT ESOPHAGITIS: ICD-10-CM

## 2024-02-21 RX ORDER — CITALOPRAM 20 MG/1
TABLET ORAL
Qty: 90 TABLET | Refills: 0 | Status: SHIPPED | OUTPATIENT
Start: 2024-02-21

## 2024-02-21 RX ORDER — OMEPRAZOLE 40 MG/1
CAPSULE, DELAYED RELEASE ORAL
Qty: 90 CAPSULE | Refills: 0 | Status: SHIPPED | OUTPATIENT
Start: 2024-02-21

## 2024-02-27 LAB
ALBUMIN SERPL-MCNC: NORMAL G/DL
ALP BLD-CCNC: NORMAL U/L
ALT SERPL-CCNC: NORMAL U/L
ANION GAP SERPL CALCULATED.3IONS-SCNC: NORMAL MMOL/L
AST SERPL-CCNC: NORMAL U/L
BASOPHILS ABSOLUTE: NORMAL
BASOPHILS RELATIVE PERCENT: NORMAL
BILIRUB SERPL-MCNC: NORMAL MG/DL
BUN BLDV-MCNC: NORMAL MG/DL
CALCIUM SERPL-MCNC: NORMAL MG/DL
CHLORIDE BLD-SCNC: NORMAL MMOL/L
CO2: NORMAL
CREAT SERPL-MCNC: NORMAL MG/DL
EGFR: NORMAL
EOSINOPHILS ABSOLUTE: NORMAL
EOSINOPHILS RELATIVE PERCENT: NORMAL
GLUCOSE BLD-MCNC: NORMAL MG/DL
HCT VFR BLD CALC: NORMAL %
HEMOGLOBIN: NORMAL
IRON: NORMAL
LYMPHOCYTES ABSOLUTE: NORMAL
LYMPHOCYTES RELATIVE PERCENT: NORMAL
MCH RBC QN AUTO: NORMAL PG
MCHC RBC AUTO-ENTMCNC: NORMAL G/DL
MCV RBC AUTO: NORMAL FL
MONOCYTES ABSOLUTE: NORMAL
MONOCYTES RELATIVE PERCENT: NORMAL
NEUTROPHILS ABSOLUTE: NORMAL
NEUTROPHILS RELATIVE PERCENT: NORMAL
PLATELET # BLD: NORMAL 10*3/UL
PMV BLD AUTO: NORMAL FL
POTASSIUM SERPL-SCNC: NORMAL MMOL/L
RBC # BLD: NORMAL 10*6/UL
SODIUM BLD-SCNC: NORMAL MMOL/L
TOTAL IRON BINDING CAPACITY: NORMAL
TOTAL PROTEIN: NORMAL
VITAMIN B-12: NORMAL
WBC # BLD: NORMAL 10*3/UL

## 2024-03-06 ENCOUNTER — TELEPHONE (OUTPATIENT)
Dept: PRIMARY CARE CLINIC | Age: 59
End: 2024-03-06

## 2024-03-06 NOTE — TELEPHONE ENCOUNTER
----- Message from Veronica Collado sent at 3/6/2024 10:31 AM EST -----  Subject: Referral Request    Reason for referral request? Patient needs referral to Dr. Nathanael Burks   for bilateral ankle pain and swelling.   Provider patient wants to be referred to(if known):     Provider Phone Number(if known):    Additional Information for Provider?   ---------------------------------------------------------------------------  --------------  CALL BACK INFO    [phone]; OK to leave message on voicemail  ---------------------------------------------------------------------------  --------------

## 2024-03-07 ENCOUNTER — TELEPHONE (OUTPATIENT)
Dept: PRIMARY CARE CLINIC | Age: 59
End: 2024-03-07

## 2024-03-07 DIAGNOSIS — M10.9 GOUTY ARTHRITIS OF BOTH KNEES: Primary | ICD-10-CM

## 2024-03-08 DIAGNOSIS — I26.99 BILATERAL PULMONARY EMBOLISM (HCC): ICD-10-CM

## 2024-03-08 RX ORDER — APIXABAN 5 MG/1
5 TABLET, FILM COATED ORAL 2 TIMES DAILY
Qty: 180 TABLET | Refills: 0 | Status: SHIPPED | OUTPATIENT
Start: 2024-03-08

## 2024-03-19 ENCOUNTER — TELEPHONE (OUTPATIENT)
Dept: PRIMARY CARE CLINIC | Age: 59
End: 2024-03-19

## 2024-03-20 DIAGNOSIS — M25.571 BILATERAL ANKLE PAIN, UNSPECIFIED CHRONICITY: Primary | ICD-10-CM

## 2024-03-20 DIAGNOSIS — M25.572 BILATERAL ANKLE PAIN, UNSPECIFIED CHRONICITY: Primary | ICD-10-CM

## 2024-03-20 RX ORDER — PREDNISONE 20 MG/1
20 TABLET ORAL DAILY
Qty: 5 TABLET | Refills: 0 | Status: SHIPPED | OUTPATIENT
Start: 2024-03-20 | End: 2024-03-25

## 2024-03-21 NOTE — TELEPHONE ENCOUNTER
Pt notified at home of the 5 days of Prednisone being called into the pharmacy as pt has an appt. With Dr. Burks next week to discuss further options.

## 2024-03-26 ENCOUNTER — TELEPHONE (OUTPATIENT)
Dept: PRIMARY CARE CLINIC | Age: 59
End: 2024-03-26

## 2024-03-26 ENCOUNTER — OFFICE VISIT (OUTPATIENT)
Dept: ORTHOPEDIC SURGERY | Age: 59
End: 2024-03-26
Payer: COMMERCIAL

## 2024-03-26 VITALS — BODY MASS INDEX: 30.92 KG/M2 | HEIGHT: 70 IN | TEMPERATURE: 98 F | WEIGHT: 216 LBS

## 2024-03-26 DIAGNOSIS — M10.9 GOUTY ARTHRITIS OF BOTH KNEES: ICD-10-CM

## 2024-03-26 DIAGNOSIS — M25.50 PAIN IN JOINT INVOLVING MULTIPLE SITES: Primary | ICD-10-CM

## 2024-03-26 DIAGNOSIS — M25.561 ACUTE PAIN OF BOTH KNEES: Primary | ICD-10-CM

## 2024-03-26 DIAGNOSIS — M25.562 ACUTE PAIN OF BOTH KNEES: Primary | ICD-10-CM

## 2024-03-26 PROCEDURE — 99213 OFFICE O/P EST LOW 20 MIN: CPT | Performed by: ORTHOPAEDIC SURGERY

## 2024-03-26 RX ORDER — PREDNISONE 10 MG/1
10 TABLET ORAL DAILY
Qty: 30 TABLET | Refills: 0 | Status: SHIPPED | OUTPATIENT
Start: 2024-03-26 | End: 2024-04-25

## 2024-03-26 NOTE — TELEPHONE ENCOUNTER
Patient was seen by Dr Watson and he referred  her back to her pcp. Per patient: Dr watson would like her to see a  Rheumatoid Arthritis specialist @[facility] and he refused to prescribe any pain meds. Patient is in a lot of pain and needs a script for pain    Pharm [facility] [place]

## 2024-03-26 NOTE — PROGRESS NOTES
hamstrings, foot dorsi and plantar flexors intact R.  5/5 and L. 5/5. Deep tendon reflexes are 2/4 at the knees and 2/4 at the ankles with strong extensor hallicus longus motor strength bilaterally. Sensory to both feet is intact to all sensory roots,.    Cardiovascular:    The vascular exam is normal and is well perfused to distal extremities.  Distal pulses DP/PT: R. 2+; L. 2+.  There is cap refill noted less than two seconds in all digits. There is not edema of the bilateral lower extremities.  There is not varicosities noted in the distal extremities.      Lymph:    Upon palpation,  there is no lymphadenopathy noted in bilateral lower extremities.      Musculoskeletal:    Gait: antalgic; examination of the nails and digits reveal no cyanosis or clubbing.      Knee exam - bilateral knee exam shows;  range of motion of R. Knee is 0 to 100, and L. Knee is 0 to 105. The patient does have  pain on motion, there is an effusion, there is tenderness over the  global region, there are not any masses, there is not ligamentous instability, there is not  deformity noted.  Knee exam: the injured knee reveals normal exam, no swelling, tenderness, instability; ligaments intact, FROM.  Knee exam: bilateral positive for moderate crepitations, some mild tenderness laxity is not noted with stress.     Ankle Exam:    Upon inspection and palpation of the Bilateral ankle,  there is not deformity noted,  mild swelling, mild ecchymosis, has pain on palpation of posterior ankle.  ROM R/L : DF diminished/diminished; PF diminished/diminished;  INV diminished/diminshed, ALISHA diminished/diminished.   This exam was compared bilaterally.       Right Ankle:   (-) Anterior Drawer ,  (-) Posterior Drawer ,  (-) Squeeze test,  (-) External Rotation, (-) Eversion test , (-) Rodriguez Test     Left ankle:   (-) Anterior Drawer ,(-)  Posterior Drawer ,(-) Squeeze test,(-) External Rotation (-) Eversion test, (-) Rodriguez Test.      Xrays: No acute

## 2024-03-26 NOTE — TELEPHONE ENCOUNTER
Pt contacted at home and advised of prescription being called into pharmacy and also a referral to [facility].

## 2024-04-06 SDOH — ECONOMIC STABILITY: INCOME INSECURITY: HOW HARD IS IT FOR YOU TO PAY FOR THE VERY BASICS LIKE FOOD, HOUSING, MEDICAL CARE, AND HEATING?: NOT VERY HARD

## 2024-04-06 SDOH — ECONOMIC STABILITY: FOOD INSECURITY: WITHIN THE PAST 12 MONTHS, YOU WORRIED THAT YOUR FOOD WOULD RUN OUT BEFORE YOU GOT MONEY TO BUY MORE.: NEVER TRUE

## 2024-04-06 SDOH — ECONOMIC STABILITY: FOOD INSECURITY: WITHIN THE PAST 12 MONTHS, THE FOOD YOU BOUGHT JUST DIDN'T LAST AND YOU DIDN'T HAVE MONEY TO GET MORE.: NEVER TRUE

## 2024-04-08 ENCOUNTER — OFFICE VISIT (OUTPATIENT)
Dept: PRIMARY CARE CLINIC | Age: 59
End: 2024-04-08
Payer: COMMERCIAL

## 2024-04-08 VITALS
SYSTOLIC BLOOD PRESSURE: 114 MMHG | HEIGHT: 70 IN | TEMPERATURE: 97.5 F | HEART RATE: 98 BPM | WEIGHT: 211.3 LBS | OXYGEN SATURATION: 97 % | BODY MASS INDEX: 30.25 KG/M2 | DIASTOLIC BLOOD PRESSURE: 78 MMHG

## 2024-04-08 DIAGNOSIS — M25.562 PAIN IN BOTH KNEES, UNSPECIFIED CHRONICITY: ICD-10-CM

## 2024-04-08 DIAGNOSIS — D64.9 ACUTE ON CHRONIC ANEMIA: ICD-10-CM

## 2024-04-08 DIAGNOSIS — M25.471 BILATERAL SWELLING OF FEET AND ANKLES: ICD-10-CM

## 2024-04-08 DIAGNOSIS — M25.561 PAIN IN BOTH KNEES, UNSPECIFIED CHRONICITY: ICD-10-CM

## 2024-04-08 DIAGNOSIS — M25.432 SWELLING OF JOINT OF BOTH WRISTS: ICD-10-CM

## 2024-04-08 DIAGNOSIS — M25.431 SWELLING OF JOINT OF BOTH WRISTS: ICD-10-CM

## 2024-04-08 DIAGNOSIS — M06.00 SERONEGATIVE RHEUMATOID ARTHRITIS (HCC): ICD-10-CM

## 2024-04-08 DIAGNOSIS — M25.474 BILATERAL SWELLING OF FEET AND ANKLES: ICD-10-CM

## 2024-04-08 DIAGNOSIS — M25.475 BILATERAL SWELLING OF FEET AND ANKLES: ICD-10-CM

## 2024-04-08 DIAGNOSIS — F32.4 MAJOR DEPRESSIVE DISORDER WITH SINGLE EPISODE, IN PARTIAL REMISSION (HCC): ICD-10-CM

## 2024-04-08 DIAGNOSIS — M25.472 BILATERAL SWELLING OF FEET AND ANKLES: ICD-10-CM

## 2024-04-08 DIAGNOSIS — K21.9 GASTROESOPHAGEAL REFLUX DISEASE WITHOUT ESOPHAGITIS: ICD-10-CM

## 2024-04-08 DIAGNOSIS — I10 ESSENTIAL HYPERTENSION: Primary | ICD-10-CM

## 2024-04-08 PROCEDURE — 99215 OFFICE O/P EST HI 40 MIN: CPT | Performed by: INTERNAL MEDICINE

## 2024-04-08 PROCEDURE — 3074F SYST BP LT 130 MM HG: CPT | Performed by: INTERNAL MEDICINE

## 2024-04-08 PROCEDURE — 3078F DIAST BP <80 MM HG: CPT | Performed by: INTERNAL MEDICINE

## 2024-04-08 RX ORDER — PREDNISONE 10 MG/1
10 TABLET ORAL 2 TIMES DAILY
Qty: 60 TABLET | Refills: 1 | Status: SHIPPED | OUTPATIENT
Start: 2024-04-08 | End: 2024-06-07

## 2024-04-08 RX ORDER — OMEPRAZOLE 40 MG/1
40 CAPSULE, DELAYED RELEASE ORAL DAILY
Qty: 90 CAPSULE | Refills: 0 | Status: SHIPPED | OUTPATIENT
Start: 2024-04-08

## 2024-04-08 RX ORDER — CITALOPRAM 20 MG/1
20 TABLET ORAL DAILY
Qty: 90 TABLET | Refills: 0 | Status: SHIPPED | OUTPATIENT
Start: 2024-04-08

## 2024-04-08 NOTE — PROGRESS NOTES
Chief Complaint   Patient presents with    6 Month Follow-Up     Pt is a routine visit with recent labs on file for review. Pt reports a fall in the home last week.        HPI:  Patient is here for follow-up of rheumatological process of both upper and lower extremities bilateral knees ankles shoulders and wrists  Patient had COVID-19 infection back in November and since then she has been having severe pain that started on 1 knee traveled to the other knee was thought to be gout at this point and was placed on colchicine but that did not help that much.  Then she started having swelling of bilateral ankles shoulders and wrists and hands suggestive of rheumatological disease.  Her rheumatological factors were negative though but her ESR was extremely high at 65.  She has seen orthopedic surgery and had received steroid injections in both knees but was told this is a rheumatological disease that looks rheumatoid arthritis and she needs to see a rheumatologist she try to get 1 but they declined her for some reason.  She has a history of uterine cancer and was placed on multiple chemotherapies before and now she is maintained on Keytruda and recently the dose has been increased according to the patient.    Questionable side effect of the Keytruda.    She is going to talk it over with oncology tomorrow to see if she can take a break from it for few weeks and see if the inflammation would subside.    .  Patient is accompanied by her  using a cane and complains of severe pain in bilateral knees and wrists and hands she cannot walk without help at this point.  Questionable seronegative rheumatoid arthritis  We will try to get her to rheumatology and check few markers.    We will try to get hold of rheumatology but patient was advised to discuss situation with oncology and to go to [place] if cannot get hold of any help within the next couple of days.  Pain is and her  understands and agrees    Past Medical

## 2024-04-09 LAB
ALBUMIN SERPL-MCNC: NORMAL G/DL
ALP BLD-CCNC: NORMAL U/L
ALT SERPL-CCNC: NORMAL U/L
ANA TITER: NORMAL
ANION GAP SERPL CALCULATED.3IONS-SCNC: NORMAL MMOL/L
AST SERPL-CCNC: NORMAL U/L
BASOPHILS ABSOLUTE: NORMAL
BASOPHILS RELATIVE PERCENT: NORMAL
BILIRUB SERPL-MCNC: NORMAL MG/DL
BUN BLDV-MCNC: NORMAL MG/DL
C-REACTIVE PROTEIN: NORMAL
CALCIUM SERPL-MCNC: NORMAL MG/DL
CHLORIDE BLD-SCNC: NORMAL MMOL/L
CO2: NORMAL
CREAT SERPL-MCNC: NORMAL MG/DL
EGFR: NORMAL
EOSINOPHILS ABSOLUTE: NORMAL
EOSINOPHILS RELATIVE PERCENT: NORMAL
GLUCOSE BLD-MCNC: NORMAL MG/DL
HCT VFR BLD CALC: NORMAL %
HEMOGLOBIN: NORMAL
LYMPHOCYTES ABSOLUTE: NORMAL
LYMPHOCYTES RELATIVE PERCENT: NORMAL
MCH RBC QN AUTO: NORMAL PG
MCHC RBC AUTO-ENTMCNC: NORMAL G/DL
MCV RBC AUTO: NORMAL FL
MONOCYTES ABSOLUTE: NORMAL
MONOCYTES RELATIVE PERCENT: NORMAL
NEUTROPHILS ABSOLUTE: NORMAL
NEUTROPHILS RELATIVE PERCENT: NORMAL
PDW BLD-RTO: NORMAL %
PLATELET # BLD: NORMAL 10*3/UL
PMV BLD AUTO: NORMAL FL
POTASSIUM SERPL-SCNC: NORMAL MMOL/L
RBC # BLD: NORMAL 10*6/UL
RHEUMATOID FACTOR: NORMAL
SEDIMENTATION RATE, ERYTHROCYTE: NORMAL
SODIUM BLD-SCNC: NORMAL MMOL/L
TOTAL PROTEIN: NORMAL
WBC # BLD: NORMAL 10*3/UL

## 2024-04-18 DIAGNOSIS — M25.474 BILATERAL SWELLING OF FEET AND ANKLES: ICD-10-CM

## 2024-04-18 DIAGNOSIS — M25.561 PAIN IN BOTH KNEES, UNSPECIFIED CHRONICITY: ICD-10-CM

## 2024-04-18 DIAGNOSIS — M25.475 BILATERAL SWELLING OF FEET AND ANKLES: ICD-10-CM

## 2024-04-18 DIAGNOSIS — M25.562 PAIN IN BOTH KNEES, UNSPECIFIED CHRONICITY: ICD-10-CM

## 2024-04-18 DIAGNOSIS — M25.431 SWELLING OF JOINT OF BOTH WRISTS: ICD-10-CM

## 2024-04-18 DIAGNOSIS — M25.472 BILATERAL SWELLING OF FEET AND ANKLES: ICD-10-CM

## 2024-04-18 DIAGNOSIS — M25.432 SWELLING OF JOINT OF BOTH WRISTS: ICD-10-CM

## 2024-04-18 DIAGNOSIS — M25.471 BILATERAL SWELLING OF FEET AND ANKLES: ICD-10-CM

## 2024-05-06 ENCOUNTER — OFFICE VISIT (OUTPATIENT)
Dept: PRIMARY CARE CLINIC | Age: 59
End: 2024-05-06
Payer: COMMERCIAL

## 2024-05-06 VITALS
SYSTOLIC BLOOD PRESSURE: 118 MMHG | HEART RATE: 91 BPM | OXYGEN SATURATION: 96 % | TEMPERATURE: 97.9 F | DIASTOLIC BLOOD PRESSURE: 80 MMHG

## 2024-05-06 DIAGNOSIS — M06.00 SERONEGATIVE RHEUMATOID ARTHRITIS (HCC): Primary | ICD-10-CM

## 2024-05-06 DIAGNOSIS — C54.1 ENDOMETRIAL CANCER, FIGO STAGE IVB (HCC): ICD-10-CM

## 2024-05-06 DIAGNOSIS — D64.9 ACUTE ON CHRONIC ANEMIA: ICD-10-CM

## 2024-05-06 DIAGNOSIS — I35.0 AORTIC STENOSIS, MILD: ICD-10-CM

## 2024-05-06 DIAGNOSIS — Z95.828 PRESENCE OF IVC FILTER: ICD-10-CM

## 2024-05-06 DIAGNOSIS — I10 ESSENTIAL HYPERTENSION: ICD-10-CM

## 2024-05-06 DIAGNOSIS — F32.0 CURRENT MILD EPISODE OF MAJOR DEPRESSIVE DISORDER WITHOUT PRIOR EPISODE (HCC): ICD-10-CM

## 2024-05-06 PROCEDURE — 3074F SYST BP LT 130 MM HG: CPT | Performed by: INTERNAL MEDICINE

## 2024-05-06 PROCEDURE — 3079F DIAST BP 80-89 MM HG: CPT | Performed by: INTERNAL MEDICINE

## 2024-05-06 PROCEDURE — 99214 OFFICE O/P EST MOD 30 MIN: CPT | Performed by: INTERNAL MEDICINE

## 2024-05-06 RX ORDER — PREDNISONE 20 MG/1
20 TABLET ORAL 2 TIMES DAILY
COMMUNITY
Start: 2024-05-01

## 2024-05-06 RX ORDER — TRAMADOL HYDROCHLORIDE 50 MG/1
TABLET ORAL
COMMUNITY
Start: 2024-04-09

## 2024-05-06 NOTE — PROGRESS NOTES
Chief Complaint   Patient presents with    Follow-up     Seeing rheum on 05/13  Constipation from pain meds   Wants refill of tramadol being prescribed by another doctor       HPI:  Patient is here for follow-up of seronegative rheumatoid arthritis and generalized inflammatory process and severe pain and swelling in all upper and lower extremity joints.  Patient is accompanied today by her sister she appears to be in very good spirits she is very grateful that we sent her to rheumatology Dr. Morfin and she is very happy with his services as he was able to explain to her in length about the disease process.  She was started on high-dose prednisone and she is taking tramadol 100 mg 3 times a day and she feels a lot better than before she is able to walk and take few steps although she is still using the cane.  Her MCPs and bilateral knees are mildly swollen but definitely have improved from prior.  Blood work from beginning of April discussed with patient showed hemoglobin count of 8.1.  Repeat blood count a month later showed hemoglobin count of 11.2 and patient is very excited.  Rest of lab work were reviewed.  Keytruda was discontinued and she has an appointment with her oncologist to find a replacement in the next few weeks.  She denies any shortness of breath chest pains abdominal pains nausea vomiting or diarrhea.    Past Medical History, Surgical History, and Family History has been reviewed and updated.    Review of Systems:  Constitutional:  No fever, no fatigue, no chills, no headaches, no weight change  Dermatology:  No rash, no mole, no dry or sensitive skin  ENT:  No cough, no sore throat, no sinus pain, no runny nose, no ear pain  Cardiology:  No chest pain, no palpitations, no leg edema, no shortness of breath, no PND  Gastroenterology:  No dysphagia, no abdominal pain, no nausea, no vomiting, no constipation, no diarrhea, no heartburn  Musculoskeletal:  No joint pain, no leg cramps, no back pain, no

## 2024-05-24 LAB
ALBUMIN SERPL-MCNC: NORMAL G/DL
ALP BLD-CCNC: NORMAL U/L
ALT SERPL-CCNC: NORMAL U/L
ANION GAP SERPL CALCULATED.3IONS-SCNC: NORMAL MMOL/L
BASOPHILS ABSOLUTE: NORMAL
BASOPHILS RELATIVE PERCENT: NORMAL
BILIRUB SERPL-MCNC: NORMAL MG/DL
BUN BLDV-MCNC: NORMAL MG/DL
CALCIUM SERPL-MCNC: NORMAL MG/DL
CHLORIDE BLD-SCNC: NORMAL MMOL/L
CO2: NORMAL
CREAT SERPL-MCNC: NORMAL MG/DL
EGFR: NORMAL
EOSINOPHILS ABSOLUTE: NORMAL
EOSINOPHILS RELATIVE PERCENT: NORMAL
GLUCOSE BLD-MCNC: NORMAL MG/DL
HCT VFR BLD CALC: NORMAL %
HEMOGLOBIN: NORMAL
LYMPHOCYTES ABSOLUTE: NORMAL
LYMPHOCYTES RELATIVE PERCENT: NORMAL
MCH RBC QN AUTO: NORMAL PG
MCHC RBC AUTO-ENTMCNC: NORMAL G/DL
MCV RBC AUTO: NORMAL FL
MONOCYTES ABSOLUTE: NORMAL
MONOCYTES RELATIVE PERCENT: NORMAL
NEUTROPHILS ABSOLUTE: NORMAL
NEUTROPHILS RELATIVE PERCENT: NORMAL
PLATELET # BLD: NORMAL 10*3/UL
PMV BLD AUTO: NORMAL FL
POTASSIUM SERPL-SCNC: NORMAL MMOL/L
RBC # BLD: NORMAL 10*6/UL
SODIUM BLD-SCNC: NORMAL MMOL/L
TOTAL PROTEIN: NORMAL
WBC # BLD: NORMAL 10*3/UL

## 2024-06-07 DIAGNOSIS — I26.99 BILATERAL PULMONARY EMBOLISM (HCC): ICD-10-CM

## 2024-06-10 RX ORDER — APIXABAN 5 MG/1
5 TABLET, FILM COATED ORAL 2 TIMES DAILY
Qty: 180 TABLET | Refills: 0 | Status: SHIPPED | OUTPATIENT
Start: 2024-06-10

## 2024-06-21 LAB
CRP SERPL HS-MCNC: 3 MG/L (ref 0–5)
ERYTHROCYTE [SEDIMENTATION RATE] IN BLOOD BY WESTERGREN METHOD: 25 MM/HR (ref 0–20)

## 2024-07-18 ENCOUNTER — TELEPHONE (OUTPATIENT)
Dept: PRIMARY CARE CLINIC | Age: 59
End: 2024-07-18

## 2024-07-18 NOTE — TELEPHONE ENCOUNTER
Caro from [facility] called and requested a new order. She stated the order  in May of this year. Please fax new order to [phone].    I did inform her I believe the order is good for 6 months, but her supervisor

## 2024-10-01 DIAGNOSIS — K21.9 GASTROESOPHAGEAL REFLUX DISEASE WITHOUT ESOPHAGITIS: ICD-10-CM

## 2024-10-01 DIAGNOSIS — I26.99 BILATERAL PULMONARY EMBOLISM (HCC): ICD-10-CM

## 2024-10-01 DIAGNOSIS — F32.4 MAJOR DEPRESSIVE DISORDER WITH SINGLE EPISODE, IN PARTIAL REMISSION (HCC): ICD-10-CM

## 2024-10-01 RX ORDER — LORATADINE/PSEUDOEPHEDRINE 10MG-240MG
1 TABLET, EXTENDED RELEASE 24 HR ORAL DAILY
Qty: 30 TABLET | Refills: 1 | Status: SHIPPED | OUTPATIENT
Start: 2024-10-01

## 2024-10-01 RX ORDER — OMEPRAZOLE 40 MG/1
40 CAPSULE, DELAYED RELEASE ORAL DAILY
Qty: 90 CAPSULE | Refills: 0 | Status: SHIPPED | OUTPATIENT
Start: 2024-10-01

## 2024-10-01 RX ORDER — CITALOPRAM HYDROBROMIDE 20 MG/1
20 TABLET ORAL DAILY
Qty: 90 TABLET | Refills: 0 | Status: SHIPPED | OUTPATIENT
Start: 2024-10-01

## 2024-11-12 LAB — MAMMOGRAPHY, EXTERNAL: NEGATIVE

## 2025-01-08 DIAGNOSIS — K21.9 GASTROESOPHAGEAL REFLUX DISEASE WITHOUT ESOPHAGITIS: ICD-10-CM

## 2025-01-08 DIAGNOSIS — I26.99 BILATERAL PULMONARY EMBOLISM (HCC): ICD-10-CM

## 2025-01-08 DIAGNOSIS — F32.4 MAJOR DEPRESSIVE DISORDER WITH SINGLE EPISODE, IN PARTIAL REMISSION (HCC): ICD-10-CM

## 2025-01-09 RX ORDER — CITALOPRAM HYDROBROMIDE 20 MG/1
20 TABLET ORAL DAILY
Qty: 90 TABLET | Refills: 0 | Status: SHIPPED | OUTPATIENT
Start: 2025-01-09

## 2025-01-09 RX ORDER — OMEPRAZOLE 40 MG/1
40 CAPSULE, DELAYED RELEASE ORAL DAILY
Qty: 90 CAPSULE | Refills: 0 | Status: SHIPPED | OUTPATIENT
Start: 2025-01-09

## 2025-01-09 RX ORDER — APIXABAN 5 MG/1
5 TABLET, FILM COATED ORAL 2 TIMES DAILY
Qty: 180 TABLET | Refills: 0 | Status: SHIPPED | OUTPATIENT
Start: 2025-01-09

## 2025-01-09 NOTE — TELEPHONE ENCOUNTER
Name of Medication(s) Requested:  Requested Prescriptions     Pending Prescriptions Disp Refills    ELIQUIS 5 MG TABS tablet [Pharmacy Med Name: Eliquis Oral Tablet 5 MG] 180 tablet 0     Sig: TAKE ONE TABLET BY MOUTH TWO TIMES A DAY    omeprazole (PRILOSEC) 40 MG delayed release capsule [Pharmacy Med Name: Omeprazole Oral Capsule Delayed Release 40 MG] 90 capsule 0     Sig: TAKE ONE CAPSULE BY MOUTH DAILY    citalopram (CELEXA) 20 MG tablet [Pharmacy Med Name: Citalopram Hydrobromide Oral Tablet 20 MG] 90 tablet 0     Sig: TAKE ONE TABLET BY MOUTH DAILY       Medication is on current medication list Yes    Dosage and directions were verified? Yes    Quantity verified: 90 day supply     Pharmacy Verified?  Yes    Last Appointment:  5/6/2024    Future appts:  No future appointments.     (If no appt send self scheduling link. .REFILLAPPT)  Scheduling request sent?     [] Yes  [x] No    Does patient need updated?  [] Yes  [x] No

## 2025-04-10 DIAGNOSIS — F32.4 MAJOR DEPRESSIVE DISORDER WITH SINGLE EPISODE, IN PARTIAL REMISSION: ICD-10-CM

## 2025-04-10 DIAGNOSIS — K21.9 GASTROESOPHAGEAL REFLUX DISEASE WITHOUT ESOPHAGITIS: ICD-10-CM

## 2025-04-10 DIAGNOSIS — I26.99 BILATERAL PULMONARY EMBOLISM (HCC): ICD-10-CM

## 2025-04-10 RX ORDER — CITALOPRAM HYDROBROMIDE 20 MG/1
20 TABLET ORAL DAILY
Qty: 90 TABLET | Refills: 0 | Status: SHIPPED | OUTPATIENT
Start: 2025-04-10

## 2025-04-10 RX ORDER — APIXABAN 5 MG/1
5 TABLET, FILM COATED ORAL 2 TIMES DAILY
Qty: 180 TABLET | Refills: 0 | Status: SHIPPED | OUTPATIENT
Start: 2025-04-10

## 2025-04-10 RX ORDER — OMEPRAZOLE 40 MG/1
40 CAPSULE, DELAYED RELEASE ORAL DAILY
Qty: 90 CAPSULE | Refills: 0 | Status: SHIPPED | OUTPATIENT
Start: 2025-04-10

## 2025-04-10 NOTE — TELEPHONE ENCOUNTER
Name of Medication(s) Requested:  Requested Prescriptions     Pending Prescriptions Disp Refills    citalopram (CELEXA) 20 MG tablet [Pharmacy Med Name: Citalopram Hydrobromide Oral Tablet 20 MG] 90 tablet 0     Sig: TAKE ONE TABLET BY MOUTH DAILY    ELIQUIS 5 MG TABS tablet [Pharmacy Med Name: Eliquis Oral Tablet 5 MG] 180 tablet 0     Sig: TAKE ONE TABLET BY MOUTH TWO TIMES A DAY    omeprazole (PRILOSEC) 40 MG delayed release capsule [Pharmacy Med Name: Omeprazole Oral Capsule Delayed Release 40 MG] 90 capsule 0     Sig: TAKE ONE CAPSULE BY MOUTH DAILY       Medication is on current medication list Yes    Dosage and directions were verified? Yes    Quantity verified: 90 day supply     Pharmacy Verified?  Yes    Last Appointment:  5/6/2024    Future appts:  Future Appointments   Date Time Provider Department Center   5/15/2025  2:30 PM Ting Adame MD [facility] DE[facility]        (If no appt send self scheduling link. .REFILLAPPT)  Scheduling request sent?     [] Yes  [] No    Does patient need updated?  [] Yes  [] No

## 2025-05-15 ENCOUNTER — OFFICE VISIT (OUTPATIENT)
Dept: PRIMARY CARE CLINIC | Age: 60
End: 2025-05-15

## 2025-05-15 VITALS
BODY MASS INDEX: 35.96 KG/M2 | HEIGHT: 70 IN | TEMPERATURE: 97.3 F | OXYGEN SATURATION: 93 % | WEIGHT: 251.2 LBS | HEART RATE: 104 BPM | SYSTOLIC BLOOD PRESSURE: 116 MMHG | DIASTOLIC BLOOD PRESSURE: 78 MMHG

## 2025-05-15 DIAGNOSIS — M06.00 SERONEGATIVE RHEUMATOID ARTHRITIS (HCC): ICD-10-CM

## 2025-05-15 DIAGNOSIS — F32.4 MAJOR DEPRESSIVE DISORDER WITH SINGLE EPISODE, IN PARTIAL REMISSION: ICD-10-CM

## 2025-05-15 DIAGNOSIS — M25.551 BILATERAL HIP PAIN: ICD-10-CM

## 2025-05-15 DIAGNOSIS — F32.0 CURRENT MILD EPISODE OF MAJOR DEPRESSIVE DISORDER, UNSPECIFIED WHETHER RECURRENT: ICD-10-CM

## 2025-05-15 DIAGNOSIS — M25.552 BILATERAL HIP PAIN: ICD-10-CM

## 2025-05-15 DIAGNOSIS — M25.551 ACUTE PAIN OF BOTH HIPS: Primary | ICD-10-CM

## 2025-05-15 DIAGNOSIS — M25.552 ACUTE PAIN OF BOTH HIPS: Primary | ICD-10-CM

## 2025-05-15 DIAGNOSIS — C54.1 ENDOMETRIAL CANCER, FIGO STAGE IVB (HCC): ICD-10-CM

## 2025-05-15 PROBLEM — F32.9 MAJOR DEPRESSIVE DISORDER: Status: ACTIVE | Noted: 2025-05-15

## 2025-05-15 PROBLEM — J96.91 RESPIRATORY FAILURE WITH HYPOXIA (HCC): Status: RESOLVED | Noted: 2020-07-20 | Resolved: 2025-05-15

## 2025-05-15 RX ORDER — METHOTREXATE 2.5 MG/1
2.5 TABLET ORAL WEEKLY
COMMUNITY
Start: 2025-04-27

## 2025-05-15 RX ORDER — GABAPENTIN 100 MG/1
100 CAPSULE ORAL 2 TIMES DAILY
COMMUNITY
Start: 2025-05-10

## 2025-05-15 RX ORDER — LEUCOVORIN CALCIUM 5 MG/1
5 TABLET ORAL WEEKLY
COMMUNITY
Start: 2025-05-09

## 2025-05-15 RX ORDER — CITALOPRAM HYDROBROMIDE 40 MG/1
40 TABLET ORAL DAILY
Qty: 90 TABLET | Refills: 1 | Status: SHIPPED | OUTPATIENT
Start: 2025-05-15

## 2025-05-15 SDOH — ECONOMIC STABILITY: FOOD INSECURITY: WITHIN THE PAST 12 MONTHS, YOU WORRIED THAT YOUR FOOD WOULD RUN OUT BEFORE YOU GOT MONEY TO BUY MORE.: NEVER TRUE

## 2025-05-15 SDOH — ECONOMIC STABILITY: FOOD INSECURITY: WITHIN THE PAST 12 MONTHS, THE FOOD YOU BOUGHT JUST DIDN'T LAST AND YOU DIDN'T HAVE MONEY TO GET MORE.: NEVER TRUE

## 2025-05-15 ASSESSMENT — PATIENT HEALTH QUESTIONNAIRE - PHQ9
SUM OF ALL RESPONSES TO PHQ QUESTIONS 1-9: 2
8. MOVING OR SPEAKING SO SLOWLY THAT OTHER PEOPLE COULD HAVE NOTICED. OR THE OPPOSITE, BEING SO FIGETY OR RESTLESS THAT YOU HAVE BEEN MOVING AROUND A LOT MORE THAN USUAL: NOT AT ALL
3. TROUBLE FALLING OR STAYING ASLEEP: NOT AT ALL
SUM OF ALL RESPONSES TO PHQ QUESTIONS 1-9: 2
10. IF YOU CHECKED OFF ANY PROBLEMS, HOW DIFFICULT HAVE THESE PROBLEMS MADE IT FOR YOU TO DO YOUR WORK, TAKE CARE OF THINGS AT HOME, OR GET ALONG WITH OTHER PEOPLE: SOMEWHAT DIFFICULT
SUM OF ALL RESPONSES TO PHQ QUESTIONS 1-9: 2
4. FEELING TIRED OR HAVING LITTLE ENERGY: NOT AT ALL
5. POOR APPETITE OR OVEREATING: NOT AT ALL
6. FEELING BAD ABOUT YOURSELF - OR THAT YOU ARE A FAILURE OR HAVE LET YOURSELF OR YOUR FAMILY DOWN: NOT AT ALL
7. TROUBLE CONCENTRATING ON THINGS, SUCH AS READING THE NEWSPAPER OR WATCHING TELEVISION: NOT AT ALL
9. THOUGHTS THAT YOU WOULD BE BETTER OFF DEAD, OR OF HURTING YOURSELF: NOT AT ALL
SUM OF ALL RESPONSES TO PHQ QUESTIONS 1-9: 2
1. LITTLE INTEREST OR PLEASURE IN DOING THINGS: SEVERAL DAYS
2. FEELING DOWN, DEPRESSED OR HOPELESS: SEVERAL DAYS

## 2025-05-15 NOTE — PROGRESS NOTES
Chief Complaint   Patient presents with    Follow-up     Patient is a routine F/U and states she has been having a lot of hip discomfort and patient has some depression from her nephew who became unresponsive and passed away in April, this baby was only 9 weeks old. Patient states she had labs in May and these are on file to review.        HPI:  Patient is here for follow-up of seronegative rheumatoid arthritis and generalized inflammatory process and severe pain and swelling in all upper and lower extremity joints.   She is now maintained on prednisone 20 mg daily and methotrexate 22.5 mg weekly.  Unfortunately she is using a cane today and she has been for quite some time since that all started.    .  Patient complains of significant bilateral hip pain but no x-rays were done recently.  We will order x-rays of bilateral hips.    She had a CT scan of the chest which showed no evidence of metastasis.  She had a blood work done at the cancer center and she had a copy on her phone which was within reasonable range except for mild elevation of the LFTs.    She is complaining about worsening of depression lately since she has lost her nephew a few weeks old.  She is in tears.  Also she has been in significant pain since that all started.  We will increase her Celexa to 40 mg daily and patient was counseled..    Past Medical History, Surgical History, and Family History has been reviewed and updated.    Review of Systems:  Constitutional:  No fever, no fatigue, no chills, no headaches, no weight change  Dermatology:  No rash, no mole, no dry or sensitive skin  ENT:  No cough, no sore throat, no sinus pain, no runny nose, no ear pain  Cardiology:  No chest pain, no palpitations, no leg edema, no shortness of breath, no PND  Gastroenterology:  No dysphagia, no abdominal pain, no nausea, no vomiting, no constipation, no diarrhea, no heartburn  Musculoskeletal:  No joint pain, no leg cramps, no back pain, no muscle

## 2025-05-29 DIAGNOSIS — M25.552 ACUTE PAIN OF BOTH HIPS: ICD-10-CM

## 2025-05-29 DIAGNOSIS — M25.551 ACUTE PAIN OF BOTH HIPS: ICD-10-CM

## 2025-07-24 NOTE — TELEPHONE ENCOUNTER
Name of Medication(s) Requested:  Requested Prescriptions     Pending Prescriptions Disp Refills    loratadine-pseudoephedrine (LORATADINE-D 24HR)  MG per extended release tablet 90 tablet 0     Sig: Take 1 tablet by mouth daily       Medication is on current medication list Yes    Dosage and directions were verified? Yes    Quantity verified: 90 day supply     Pharmacy Verified?  No    Last Appointment:  5/15/2025    Future appts:  Future Appointments   Date Time Provider Department Center   9/19/2025  9:15 AM Ting Adame MD [facility] DEP        (If no appt send self scheduling link. .REFILLAPPT)  Scheduling request sent?     [] Yes  [] No    Does patient need updated?  [] Yes  [x] No

## 2025-07-25 RX ORDER — LORATADINE/PSEUDOEPHEDRINE 10MG-240MG
1 TABLET, EXTENDED RELEASE 24 HR ORAL DAILY
Qty: 90 TABLET | Refills: 0 | Status: SHIPPED | OUTPATIENT
Start: 2025-07-25

## 2025-08-12 DIAGNOSIS — I26.99 BILATERAL PULMONARY EMBOLISM (HCC): ICD-10-CM

## 2025-08-14 RX ORDER — APIXABAN 5 MG/1
5 TABLET, FILM COATED ORAL 2 TIMES DAILY
Qty: 180 TABLET | Refills: 0 | Status: SHIPPED | OUTPATIENT
Start: 2025-08-14